# Patient Record
Sex: FEMALE | Race: OTHER | Employment: FULL TIME | URBAN - METROPOLITAN AREA
[De-identification: names, ages, dates, MRNs, and addresses within clinical notes are randomized per-mention and may not be internally consistent; named-entity substitution may affect disease eponyms.]

---

## 2017-01-11 ENCOUNTER — OFFICE VISIT (OUTPATIENT)
Dept: FAMILY MEDICINE CLINIC | Facility: CLINIC | Age: 32
End: 2017-01-11

## 2017-01-11 VITALS
TEMPERATURE: 98 F | SYSTOLIC BLOOD PRESSURE: 110 MMHG | WEIGHT: 185 LBS | HEIGHT: 64 IN | BODY MASS INDEX: 31.58 KG/M2 | DIASTOLIC BLOOD PRESSURE: 78 MMHG | RESPIRATION RATE: 16 BRPM | HEART RATE: 72 BPM

## 2017-01-11 DIAGNOSIS — J06.9 ACUTE URI: ICD-10-CM

## 2017-01-11 DIAGNOSIS — H66.91 ACUTE EAR INFECTION, RIGHT: ICD-10-CM

## 2017-01-11 PROCEDURE — 99213 OFFICE O/P EST LOW 20 MIN: CPT | Performed by: FAMILY MEDICINE

## 2017-01-11 PROCEDURE — 99212 OFFICE O/P EST SF 10 MIN: CPT | Performed by: FAMILY MEDICINE

## 2017-01-11 RX ORDER — AMOXICILLIN 875 MG/1
875 TABLET, COATED ORAL 2 TIMES DAILY
Qty: 20 TABLET | Refills: 0 | Status: SHIPPED | OUTPATIENT
Start: 2017-01-11 | End: 2017-02-06 | Stop reason: ALTCHOICE

## 2017-01-11 NOTE — PROGRESS NOTES
HPI:    Patient ID: Gertrude Allred is a 36year old female. HPI Comments: Pt presents with cold symptoms for 4 days. Pt now has had cough, sore throat and ear aches. No fevers. Pt has tried otc remedies without relief. Pt states no sick contacts.

## 2017-02-02 ENCOUNTER — TELEPHONE (OUTPATIENT)
Dept: FAMILY MEDICINE CLINIC | Facility: CLINIC | Age: 32
End: 2017-02-02

## 2017-02-02 ENCOUNTER — HOSPITAL ENCOUNTER (OUTPATIENT)
Age: 32
Discharge: HOME | End: 2017-02-02
Attending: EMERGENCY MEDICINE
Payer: COMMERCIAL

## 2017-02-02 VITALS
OXYGEN SATURATION: 96 % | RESPIRATION RATE: 18 BRPM | SYSTOLIC BLOOD PRESSURE: 126 MMHG | HEIGHT: 63 IN | TEMPERATURE: 99 F | HEART RATE: 82 BPM | BODY MASS INDEX: 31.89 KG/M2 | WEIGHT: 180 LBS | DIASTOLIC BLOOD PRESSURE: 76 MMHG

## 2017-02-02 DIAGNOSIS — J40 BRONCHITIS: Primary | ICD-10-CM

## 2017-02-02 PROCEDURE — 99204 OFFICE O/P NEW MOD 45 MIN: CPT

## 2017-02-02 PROCEDURE — 99213 OFFICE O/P EST LOW 20 MIN: CPT

## 2017-02-02 RX ORDER — ALBUTEROL SULFATE 90 UG/1
2 AEROSOL, METERED RESPIRATORY (INHALATION) EVERY 4 HOURS PRN
Qty: 1 INHALER | Refills: 0 | Status: SHIPPED | OUTPATIENT
Start: 2017-02-02 | End: 2017-03-04

## 2017-02-02 NOTE — TELEPHONE ENCOUNTER
Reason for Call/Chief Complaint: Having periods of SOB, difficulty taking a deep breath  Onset: Friday  Nursing Assessment/Associated Symptoms: Pt states that it began on Friday and happened again yesterday.  She was going up the stairs on Friday when she f

## 2017-02-02 NOTE — ED PROVIDER NOTES
Patient Seen in: [address]    History   Patient presents with:  Dyspnea TRISHA SOB (respiratory)    Stated Complaint: SOB    HPI    Patient is a 47-year-old female whose had URI symptoms for a couple weeks.   She states that otherwise stated in HPI.     Physical Exam       ED Triage Vitals   BP 02/02/17 1658 126/76 mmHg   Pulse 02/02/17 1658 82   Resp 02/02/17 1658 18   Temp 02/02/17 1658 98.7 °F (37.1 °C)   Temp src 02/02/17 1658 Oral   SpO2 02/02/17 1658 96 %   O2 Device 02/0 79863  [phone]    Schedule an appointment as soon as possible for a visit in 2 days        Medications Prescribed:  Current Discharge Medication List    START taking these medications    Albuterol Sulfate  (90 Base) MCG/ACT Inhalation Aero Soln

## 2017-02-03 ENCOUNTER — TELEPHONE (OUTPATIENT)
Dept: FAMILY MEDICINE CLINIC | Facility: CLINIC | Age: 32
End: 2017-02-03

## 2017-02-06 ENCOUNTER — OFFICE VISIT (OUTPATIENT)
Dept: FAMILY MEDICINE CLINIC | Facility: CLINIC | Age: 32
End: 2017-02-06

## 2017-02-06 VITALS
DIASTOLIC BLOOD PRESSURE: 72 MMHG | WEIGHT: 160 LBS | HEART RATE: 73 BPM | SYSTOLIC BLOOD PRESSURE: 108 MMHG | BODY MASS INDEX: 28 KG/M2 | TEMPERATURE: 98 F | RESPIRATION RATE: 20 BRPM

## 2017-02-06 DIAGNOSIS — J20.9 ACUTE BRONCHITIS, UNSPECIFIED ORGANISM: ICD-10-CM

## 2017-02-06 DIAGNOSIS — H66.91 ACUTE EAR INFECTION, RIGHT: ICD-10-CM

## 2017-02-06 PROCEDURE — 99212 OFFICE O/P EST SF 10 MIN: CPT | Performed by: FAMILY MEDICINE

## 2017-02-06 PROCEDURE — 99213 OFFICE O/P EST LOW 20 MIN: CPT | Performed by: FAMILY MEDICINE

## 2017-02-06 RX ORDER — AZITHROMYCIN 250 MG/1
TABLET, FILM COATED ORAL
Qty: 6 TABLET | Refills: 0 | Status: SHIPPED | OUTPATIENT
Start: 2017-02-06 | End: 2017-02-13

## 2017-02-06 RX ORDER — BENZONATATE 200 MG/1
200 CAPSULE ORAL 3 TIMES DAILY PRN
Qty: 30 CAPSULE | Refills: 0 | Status: SHIPPED | OUTPATIENT
Start: 2017-02-06 | End: 2017-02-13

## 2017-02-06 NOTE — PROGRESS NOTES
HPI:    Patient ID: Guillermo Camacho is a 36year old female. HPI Comments: Pt presents for follow up from the urgent care for cough/ cold symptoms. Was diagnosed with bronchitis and given an inhaler. Patient states symptoms are not much better.  No fev After discussion with patient, zpak and tessalon as directed; Over the counter remedies discussed; To call if worse or not better; Follow up in one week if not resolved or as needed if worse.       No orders of the defined types were placed in this encounte

## 2017-02-13 ENCOUNTER — OFFICE VISIT (OUTPATIENT)
Dept: OBGYN CLINIC | Facility: CLINIC | Age: 32
End: 2017-02-13

## 2017-02-13 VITALS
SYSTOLIC BLOOD PRESSURE: 118 MMHG | HEART RATE: 79 BPM | BODY MASS INDEX: 30.73 KG/M2 | DIASTOLIC BLOOD PRESSURE: 81 MMHG | HEIGHT: 64 IN | WEIGHT: 180 LBS

## 2017-02-13 DIAGNOSIS — N92.6 MISSED MENSES: Primary | ICD-10-CM

## 2017-02-13 LAB
CONTROL LINE PRESENT WITH A CLEAR BACKGROUND (YES/NO): YES YES/NO
KIT LOT #: 0 NUMERIC
PREGNANCY TEST, URINE: POSITIVE

## 2017-02-13 PROCEDURE — 99202 OFFICE O/P NEW SF 15 MIN: CPT | Performed by: ADVANCED PRACTICE MIDWIFE

## 2017-02-13 PROCEDURE — 81025 URINE PREGNANCY TEST: CPT | Performed by: ADVANCED PRACTICE MIDWIFE

## 2017-02-13 NOTE — PROGRESS NOTES
HPI:   Gertrude Allred is a 36year old female who presents for a missed menses visit. Has taken x2 rounds of antibiotics for ear infection / bronchitis in the past month. Completed all medications and not taking the cough suppressant prescribed (CAT C). No  GENERAL: well developed, well nourished,in no apparent distress  Deferred  NEURO: Oriented times three, motor and sensory are grossly intact    ASSESSMENT AND PLAcN:   Rayo Wheeler is a 36year old female who presents for a missed menses visit.

## 2017-02-15 ENCOUNTER — TELEPHONE (OUTPATIENT)
Dept: OBGYN CLINIC | Facility: CLINIC | Age: 32
End: 2017-02-15

## 2017-02-15 NOTE — TELEPHONE ENCOUNTER
Needs ob nurse appt. No appts or openings for February or schedule not open for March call pt once posted.  # [phone]. Please advise.

## 2017-02-20 ENCOUNTER — TELEPHONE (OUTPATIENT)
Dept: OBGYN CLINIC | Facility: CLINIC | Age: 32
End: 2017-02-20

## 2017-02-20 NOTE — TELEPHONE ENCOUNTER
Pt states would like to \"transfer\" from midwives to our practice as feels  \"comfortable with doctors\".   Pt states was also told by midwives they \"will not place monitor during labor to be able to listen to baby\" Pt states \"does not want that\" for h

## 2017-02-20 NOTE — TELEPHONE ENCOUNTER
Pt was advised the appts for March for OBN have not been added to the schedule yet. Pt will be notified once the schedule for OBN is available. Pt agrees with plan.

## 2017-03-01 ENCOUNTER — NURSE ONLY (OUTPATIENT)
Dept: OBGYN CLINIC | Facility: CLINIC | Age: 32
End: 2017-03-01

## 2017-03-01 ENCOUNTER — LAB ENCOUNTER (OUTPATIENT)
Dept: LAB | Facility: HOSPITAL | Age: 32
End: 2017-03-01
Attending: OBSTETRICS & GYNECOLOGY
Payer: COMMERCIAL

## 2017-03-01 VITALS — WEIGHT: 182 LBS | BODY MASS INDEX: 31.07 KG/M2 | HEIGHT: 64 IN

## 2017-03-01 DIAGNOSIS — Z34.81 ENCOUNTER FOR SUPERVISION OF OTHER NORMAL PREGNANCY IN FIRST TRIMESTER: Primary | ICD-10-CM

## 2017-03-01 DIAGNOSIS — Z34.81 ENCOUNTER FOR SUPERVISION OF OTHER NORMAL PREGNANCY IN FIRST TRIMESTER: ICD-10-CM

## 2017-03-01 LAB
ANTIBODY SCREEN: NEGATIVE
BASOPHILS # BLD: 0 K/UL (ref 0–0.2)
BASOPHILS NFR BLD: 0 %
EOSINOPHIL # BLD: 0.3 K/UL (ref 0–0.7)
EOSINOPHIL NFR BLD: 3 %
ERYTHROCYTE [DISTWIDTH] IN BLOOD BY AUTOMATED COUNT: 13.1 % (ref 11–15)
HCT VFR BLD AUTO: 41.3 % (ref 35–48)
HGB BLD-MCNC: 13.7 G/DL (ref 12–16)
LYMPHOCYTES # BLD: 2.6 K/UL (ref 1–4)
LYMPHOCYTES NFR BLD: 26 %
MCH RBC QN AUTO: 29.6 PG (ref 27–32)
MCHC RBC AUTO-ENTMCNC: 33.1 G/DL (ref 32–37)
MCV RBC AUTO: 89.4 FL (ref 80–100)
MONOCYTES # BLD: 0.4 K/UL (ref 0–1)
MONOCYTES NFR BLD: 4 %
NEUTROPHILS # BLD AUTO: 6.5 K/UL (ref 1.8–7.7)
NEUTROPHILS NFR BLD: 66 %
PLATELET # BLD AUTO: 210 K/UL (ref 140–400)
PMV BLD AUTO: 9.4 FL (ref 7.4–10.3)
RBC # BLD AUTO: 4.62 M/UL (ref 3.7–5.4)
RH BLOOD TYPE: POSITIVE
RUBV IGG SER-ACNC: 18.1 IU/ML
WBC # BLD AUTO: 9.8 K/UL (ref 4–11)

## 2017-03-01 PROCEDURE — 86787 VARICELLA-ZOSTER ANTIBODY: CPT

## 2017-03-01 PROCEDURE — 86900 BLOOD TYPING SEROLOGIC ABO: CPT

## 2017-03-01 PROCEDURE — 86762 RUBELLA ANTIBODY: CPT

## 2017-03-01 PROCEDURE — 36415 COLL VENOUS BLD VENIPUNCTURE: CPT

## 2017-03-01 PROCEDURE — 87389 HIV-1 AG W/HIV-1&-2 AB AG IA: CPT

## 2017-03-01 PROCEDURE — 85025 COMPLETE CBC W/AUTO DIFF WBC: CPT

## 2017-03-01 PROCEDURE — 86850 RBC ANTIBODY SCREEN: CPT

## 2017-03-01 PROCEDURE — 86901 BLOOD TYPING SEROLOGIC RH(D): CPT

## 2017-03-01 PROCEDURE — 86803 HEPATITIS C AB TEST: CPT

## 2017-03-01 PROCEDURE — 86780 TREPONEMA PALLIDUM: CPT

## 2017-03-01 PROCEDURE — 87340 HEPATITIS B SURFACE AG IA: CPT

## 2017-03-01 NOTE — PROGRESS NOTES
Pt seen for OBN appt today with no complaints. Normal PN labs plus one hour GTT, Hep C, and Varicella ordered. Pt advised all labs must be completed and resulted prior to MD appt.   Pt scheduled NPN appt with MD.    Partner's name is Shadia Cordero gregory

## 2017-03-02 LAB
HBV SURFACE AG SERPL QL IA: NONREACTIVE
HCV AB SERPL QL IA: NONREACTIVE
HIV1+2 AB SERPL QL IA: NONREACTIVE

## 2017-03-03 LAB
T PALLIDUM AB SER QL: NEGATIVE
VZV IGG SER IA-ACNC: 414.8 (ref 165–?)

## 2017-03-04 ENCOUNTER — APPOINTMENT (OUTPATIENT)
Dept: LAB | Facility: HOSPITAL | Age: 32
End: 2017-03-04
Attending: OBSTETRICS & GYNECOLOGY
Payer: COMMERCIAL

## 2017-03-04 DIAGNOSIS — Z34.81 ENCOUNTER FOR SUPERVISION OF OTHER NORMAL PREGNANCY IN FIRST TRIMESTER: ICD-10-CM

## 2017-03-04 LAB — GLUCOSE 1H P 50 G GLC PO SERPL-MCNC: 156 MG/DL

## 2017-03-04 PROCEDURE — 36415 COLL VENOUS BLD VENIPUNCTURE: CPT

## 2017-03-04 PROCEDURE — 82950 GLUCOSE TEST: CPT

## 2017-03-04 PROCEDURE — 87086 URINE CULTURE/COLONY COUNT: CPT

## 2017-03-06 ENCOUNTER — OFFICE VISIT (OUTPATIENT)
Dept: OBGYN CLINIC | Facility: CLINIC | Age: 32
End: 2017-03-06

## 2017-03-06 ENCOUNTER — TELEPHONE (OUTPATIENT)
Dept: OBGYN CLINIC | Facility: CLINIC | Age: 32
End: 2017-03-06

## 2017-03-06 VITALS
HEART RATE: 83 BPM | SYSTOLIC BLOOD PRESSURE: 119 MMHG | DIASTOLIC BLOOD PRESSURE: 81 MMHG | WEIGHT: 182 LBS | BODY MASS INDEX: 31 KG/M2

## 2017-03-06 DIAGNOSIS — O20.0 THREATENED ABORTION, ANTEPARTUM: Primary | ICD-10-CM

## 2017-03-06 PROCEDURE — 99212 OFFICE O/P EST SF 10 MIN: CPT | Performed by: OBSTETRICS & GYNECOLOGY

## 2017-03-06 PROCEDURE — 76815 OB US LIMITED FETUS(S): CPT | Performed by: OBSTETRICS & GYNECOLOGY

## 2017-03-06 RX ORDER — PNV,CALCIUM 72/IRON/FOLIC ACID 27 MG-1 MG
TABLET ORAL
Refills: 6 | COMMUNITY
Start: 2017-02-18 | End: 2019-05-02

## 2017-03-06 NOTE — TELEPHONE ENCOUNTER
8W0D  Pt states that she had red spotting and cramping that started today. Pt denies any recent BM or IC today. Pt did have IC yesterday. Pt states that it is vaginal bleeding. Pt made an appt to see Geni Dasilva [phone] today at [facility] [address].

## 2017-03-07 ENCOUNTER — TELEPHONE (OUTPATIENT)
Dept: OBGYN CLINIC | Facility: CLINIC | Age: 32
End: 2017-03-07

## 2017-03-07 DIAGNOSIS — R73.09 ELEVATED GLUCOSE TOLERANCE TEST: Primary | ICD-10-CM

## 2017-03-07 NOTE — TELEPHONE ENCOUNTER
----- Message from [employer], DO sent at 3/5/2017 10:02 PM CST -----  Failed 1h GTT. Needs 3h GTT. Please notify and coordinate 3h GTT. Thanks!  -DEJA-

## 2017-03-07 NOTE — TELEPHONE ENCOUNTER
Pt advised of below results per DEJA. Phone # to schedule given, fasting instructions given. Pt states understanding. 3 hr GTT ordered.

## 2017-03-13 ENCOUNTER — TELEPHONE (OUTPATIENT)
Dept: OBGYN CLINIC | Facility: CLINIC | Age: 32
End: 2017-03-13

## 2017-03-13 ENCOUNTER — INITIAL PRENATAL (OUTPATIENT)
Dept: OBGYN CLINIC | Facility: CLINIC | Age: 32
End: 2017-03-13

## 2017-03-13 VITALS
WEIGHT: 181 LBS | BODY MASS INDEX: 31 KG/M2 | HEART RATE: 67 BPM | DIASTOLIC BLOOD PRESSURE: 74 MMHG | SYSTOLIC BLOOD PRESSURE: 118 MMHG

## 2017-03-13 DIAGNOSIS — Z34.81 ENCOUNTER FOR SUPERVISION OF OTHER NORMAL PREGNANCY IN FIRST TRIMESTER: Primary | ICD-10-CM

## 2017-03-13 LAB
APPEARANCE: CLEAR
MULTISTIX LOT#: NORMAL NUMERIC
PH, URINE: 5 (ref 4.5–8)
SPECIFIC GRAVITY: 1.01 (ref 1–1.03)
URINE-COLOR: YELLOW

## 2017-03-13 PROCEDURE — 76815 OB US LIMITED FETUS(S): CPT | Performed by: OBSTETRICS & GYNECOLOGY

## 2017-03-14 NOTE — PROGRESS NOTES
Gc/chlamydia done, lps 8/2016 at [place]- pt will bring record, wants FTS, abnormal GTT- needs 3 hr GTT

## 2017-03-14 NOTE — TELEPHONE ENCOUNTER
Lmtcfrank. Please inform  The patient her order for first trimester screening was routed to [facility] and she can call [phone] to schedule an appointment.

## 2017-03-15 LAB
C TRACH DNA SPEC QL NAA+PROBE: NEGATIVE
N GONORRHOEA DNA SPEC QL NAA+PROBE: NEGATIVE
T VAGINALIS RRNA SPEC QL NAA+PROBE: NEGATIVE

## 2017-04-06 ENCOUNTER — TELEPHONE (OUTPATIENT)
Dept: OBGYN CLINIC | Facility: CLINIC | Age: 32
End: 2017-04-06

## 2017-04-07 ENCOUNTER — HOSPITAL ENCOUNTER (OUTPATIENT)
Dept: PERINATAL CARE | Facility: HOSPITAL | Age: 32
Discharge: HOME | End: 2017-04-07
Attending: OBSTETRICS & GYNECOLOGY
Payer: COMMERCIAL

## 2017-04-07 VITALS — SYSTOLIC BLOOD PRESSURE: 120 MMHG | DIASTOLIC BLOOD PRESSURE: 81 MMHG | HEART RATE: 67 BPM

## 2017-04-07 DIAGNOSIS — Z36.9 FIRST TRIMESTER SCREENING: ICD-10-CM

## 2017-04-07 DIAGNOSIS — Z36.9 FIRST TRIMESTER SCREENING: Primary | ICD-10-CM

## 2017-04-07 PROCEDURE — 76801 OB US < 14 WKS SINGLE FETUS: CPT | Performed by: OBSTETRICS & GYNECOLOGY

## 2017-04-07 PROCEDURE — 76813 OB US NUCHAL MEAS 1 GEST: CPT

## 2017-04-07 PROCEDURE — 99242 OFF/OP CONSLTJ NEW/EST SF 20: CPT | Performed by: OBSTETRICS & GYNECOLOGY

## 2017-04-07 PROCEDURE — 76813 OB US NUCHAL MEAS 1 GEST: CPT | Performed by: OBSTETRICS & GYNECOLOGY

## 2017-04-07 NOTE — PROGRESS NOTES
Viky Kruse is a 36year old female. Reason for Consult:   First Trimester Screen. Doing well. No complaints. She failed her one hour GTT. Has 3 hour GTT scheduled.     Review of History:     OB History:    Obstetric History     T1  P0 Stomach and bladder seen. Uterus and adnexa appeared normal        The complete screening results based on maternal age, NT, MALENA-A, free B-HCG will be faxed to your office directly from the lab.    The final report will give the specific risk for Down sy

## 2017-04-11 ENCOUNTER — TELEPHONE (OUTPATIENT)
Dept: OBGYN CLINIC | Facility: CLINIC | Age: 32
End: 2017-04-11

## 2017-04-11 ENCOUNTER — ROUTINE PRENATAL (OUTPATIENT)
Dept: OBGYN CLINIC | Facility: CLINIC | Age: 32
End: 2017-04-11

## 2017-04-11 VITALS
WEIGHT: 178 LBS | SYSTOLIC BLOOD PRESSURE: 119 MMHG | HEART RATE: 87 BPM | BODY MASS INDEX: 31 KG/M2 | DIASTOLIC BLOOD PRESSURE: 81 MMHG

## 2017-04-11 DIAGNOSIS — Z34.81 ENCOUNTER FOR SUPERVISION OF OTHER NORMAL PREGNANCY IN FIRST TRIMESTER: Primary | ICD-10-CM

## 2017-04-11 NOTE — PROGRESS NOTES
C/O sharp pain by her hip bones when standing- round ligament pain. No bleeding or cramping. Denies N/V. Reviewed increase hydration.    RTC 4 wks

## 2017-04-11 NOTE — TELEPHONE ENCOUNTER
Pt calling for 1st trimester US results. Pt notified a message will be forwarded to [facility](on call) to please review and advise on US results.

## 2017-04-12 ENCOUNTER — TELEPHONE (OUTPATIENT)
Dept: OBGYN CLINIC | Facility: CLINIC | Age: 32
End: 2017-04-12

## 2017-04-12 NOTE — TELEPHONE ENCOUNTER
Pt has not done the 3 hr gtt that was ordered on 3/13/17. Pt states she has an appt scheduled for this Saturday to have test done. Lab postponed 2 weeks.

## 2017-04-13 ENCOUNTER — TELEPHONE (OUTPATIENT)
Dept: PERINATAL CARE | Facility: HOSPITAL | Age: 32
End: 2017-04-13

## 2017-04-13 NOTE — TELEPHONE ENCOUNTER
First trimester screen results reviewed by Dr. Kirit Joseph for Trisomy 13,18 and 21 are all 1 in > 10,000  [address] notified by phone of results. She verbalized understanding.    The record sent to be scanned in to Epic  A copy of the report will be fax

## 2017-04-15 ENCOUNTER — LAB ENCOUNTER (OUTPATIENT)
Dept: LAB | Age: 32
End: 2017-04-15
Attending: OBSTETRICS & GYNECOLOGY
Payer: COMMERCIAL

## 2017-04-15 DIAGNOSIS — Z34.81 ENCOUNTER FOR SUPERVISION OF OTHER NORMAL PREGNANCY IN FIRST TRIMESTER: ICD-10-CM

## 2017-04-15 PROCEDURE — 36415 COLL VENOUS BLD VENIPUNCTURE: CPT

## 2017-04-15 PROCEDURE — 82951 GLUCOSE TOLERANCE TEST (GTT): CPT

## 2017-04-15 PROCEDURE — 82952 GTT-ADDED SAMPLES: CPT

## 2017-04-19 ENCOUNTER — TELEPHONE (OUTPATIENT)
Dept: OBGYN CLINIC | Facility: CLINIC | Age: 32
End: 2017-04-19

## 2017-05-10 ENCOUNTER — APPOINTMENT (OUTPATIENT)
Dept: LAB | Facility: HOSPITAL | Age: 32
End: 2017-05-10
Attending: OBSTETRICS & GYNECOLOGY
Payer: COMMERCIAL

## 2017-05-10 ENCOUNTER — ROUTINE PRENATAL (OUTPATIENT)
Dept: OBGYN CLINIC | Facility: CLINIC | Age: 32
End: 2017-05-10

## 2017-05-10 VITALS
WEIGHT: 177 LBS | BODY MASS INDEX: 30 KG/M2 | DIASTOLIC BLOOD PRESSURE: 71 MMHG | HEART RATE: 86 BPM | SYSTOLIC BLOOD PRESSURE: 105 MMHG

## 2017-05-10 DIAGNOSIS — Z34.82 ENCOUNTER FOR SUPERVISION OF OTHER NORMAL PREGNANCY IN SECOND TRIMESTER: Primary | ICD-10-CM

## 2017-05-10 DIAGNOSIS — Z34.82 ENCOUNTER FOR SUPERVISION OF OTHER NORMAL PREGNANCY IN SECOND TRIMESTER: ICD-10-CM

## 2017-05-10 PROCEDURE — 82105 ALPHA-FETOPROTEIN SERUM: CPT

## 2017-05-10 PROCEDURE — 36415 COLL VENOUS BLD VENIPUNCTURE: CPT

## 2017-05-18 ENCOUNTER — HOSPITAL ENCOUNTER (OUTPATIENT)
Dept: ULTRASOUND IMAGING | Facility: HOSPITAL | Age: 32
Discharge: HOME | End: 2017-05-18
Attending: OBSTETRICS & GYNECOLOGY
Payer: COMMERCIAL

## 2017-05-18 DIAGNOSIS — Z34.82 ENCOUNTER FOR SUPERVISION OF OTHER NORMAL PREGNANCY IN SECOND TRIMESTER: ICD-10-CM

## 2017-05-18 PROCEDURE — 76805 OB US >/= 14 WKS SNGL FETUS: CPT | Performed by: OBSTETRICS & GYNECOLOGY

## 2017-05-22 ENCOUNTER — TELEPHONE (OUTPATIENT)
Dept: OBGYN CLINIC | Facility: CLINIC | Age: 32
End: 2017-05-22

## 2017-05-22 ENCOUNTER — ROUTINE PRENATAL (OUTPATIENT)
Dept: OBGYN CLINIC | Facility: CLINIC | Age: 32
End: 2017-05-22

## 2017-05-22 VITALS
DIASTOLIC BLOOD PRESSURE: 80 MMHG | HEART RATE: 88 BPM | SYSTOLIC BLOOD PRESSURE: 116 MMHG | BODY MASS INDEX: 30 KG/M2 | WEIGHT: 176 LBS

## 2017-05-22 DIAGNOSIS — Z34.82 ENCOUNTER FOR SUPERVISION OF OTHER NORMAL PREGNANCY IN SECOND TRIMESTER: Primary | ICD-10-CM

## 2017-05-22 NOTE — TELEPHONE ENCOUNTER
Pt is 19w0d and reports left side pain 5/10, pt states pain is above the hip line and below the rib cage. Pt states it is a dull pain that will last for a few minutes and then returns since yesterday afternoon. Pt denies constipation, last BM was today.  Pt

## 2017-06-06 ENCOUNTER — ROUTINE PRENATAL (OUTPATIENT)
Dept: OBGYN CLINIC | Facility: CLINIC | Age: 32
End: 2017-06-06

## 2017-06-06 VITALS
HEART RATE: 85 BPM | WEIGHT: 177.63 LBS | SYSTOLIC BLOOD PRESSURE: 109 MMHG | BODY MASS INDEX: 30 KG/M2 | DIASTOLIC BLOOD PRESSURE: 71 MMHG

## 2017-06-06 DIAGNOSIS — Z34.92 ENCOUNTER FOR SUPERVISION OF NORMAL PREGNANCY IN SECOND TRIMESTER, UNSPECIFIED GRAVIDITY: Primary | ICD-10-CM

## 2017-07-05 ENCOUNTER — TELEPHONE (OUTPATIENT)
Dept: OBGYN CLINIC | Facility: CLINIC | Age: 32
End: 2017-07-05

## 2017-07-05 ENCOUNTER — ROUTINE PRENATAL (OUTPATIENT)
Dept: OBGYN CLINIC | Facility: CLINIC | Age: 32
End: 2017-07-05

## 2017-07-05 VITALS
SYSTOLIC BLOOD PRESSURE: 122 MMHG | WEIGHT: 178 LBS | DIASTOLIC BLOOD PRESSURE: 75 MMHG | BODY MASS INDEX: 31 KG/M2 | HEART RATE: 97 BPM

## 2017-07-05 DIAGNOSIS — Z34.82 ENCOUNTER FOR SUPERVISION OF OTHER NORMAL PREGNANCY IN SECOND TRIMESTER: Primary | ICD-10-CM

## 2017-07-05 LAB
APPEARANCE: CLEAR
MULTISTIX LOT#: NORMAL NUMERIC
URINE-COLOR: YELLOW

## 2017-07-05 NOTE — TELEPHONE ENCOUNTER
Breast pump order received via fax from [facility].  Form completed and faxed back to [facility].

## 2017-07-22 ENCOUNTER — APPOINTMENT (OUTPATIENT)
Dept: LAB | Facility: HOSPITAL | Age: 32
End: 2017-07-22
Attending: OBSTETRICS & GYNECOLOGY
Payer: COMMERCIAL

## 2017-07-22 DIAGNOSIS — Z34.82 ENCOUNTER FOR SUPERVISION OF OTHER NORMAL PREGNANCY IN SECOND TRIMESTER: ICD-10-CM

## 2017-07-22 LAB
ERYTHROCYTE [DISTWIDTH] IN BLOOD BY AUTOMATED COUNT: 14 % (ref 11–15)
GLUCOSE 1H P 50 G GLC PO SERPL-MCNC: 142 MG/DL
HCT VFR BLD AUTO: 33.3 % (ref 35–48)
HGB BLD-MCNC: 11.3 G/DL (ref 12–16)
MCH RBC QN AUTO: 29 PG (ref 27–32)
MCHC RBC AUTO-ENTMCNC: 33.7 G/DL (ref 32–37)
MCV RBC AUTO: 85.9 FL (ref 80–100)
PLATELET # BLD AUTO: 207 K/UL (ref 140–400)
PMV BLD AUTO: 8.3 FL (ref 7.4–10.3)
RBC # BLD AUTO: 3.88 M/UL (ref 3.7–5.4)
WBC # BLD AUTO: 8 K/UL (ref 4–11)

## 2017-07-22 PROCEDURE — 36415 COLL VENOUS BLD VENIPUNCTURE: CPT

## 2017-07-22 PROCEDURE — 85027 COMPLETE CBC AUTOMATED: CPT

## 2017-07-22 PROCEDURE — 82950 GLUCOSE TEST: CPT

## 2017-07-25 ENCOUNTER — TELEPHONE (OUTPATIENT)
Dept: OBGYN CLINIC | Facility: CLINIC | Age: 32
End: 2017-07-25

## 2017-07-25 DIAGNOSIS — O99.810 ABNORMAL MATERNAL GLUCOSE TOLERANCE, ANTEPARTUM: Primary | ICD-10-CM

## 2017-07-25 NOTE — TELEPHONE ENCOUNTER
LMTCB. Pt needs to be informed that she did not pass one hour gtt and she will need to take 3 hr gtt.

## 2017-07-25 NOTE — TELEPHONE ENCOUNTER
----- Message from Peter See MD sent at 7/23/2017  2:01 PM CDT -----  Pt did not pass one hour GTT and will need to take 3 hour GTT

## 2017-07-26 ENCOUNTER — ROUTINE PRENATAL (OUTPATIENT)
Dept: OBGYN CLINIC | Facility: CLINIC | Age: 32
End: 2017-07-26

## 2017-07-26 VITALS
WEIGHT: 181 LBS | HEART RATE: 96 BPM | SYSTOLIC BLOOD PRESSURE: 105 MMHG | BODY MASS INDEX: 31 KG/M2 | DIASTOLIC BLOOD PRESSURE: 71 MMHG

## 2017-07-26 DIAGNOSIS — Z34.93 ENCOUNTER FOR SUPERVISION OF NORMAL PREGNANCY IN THIRD TRIMESTER, UNSPECIFIED GRAVIDITY: Primary | ICD-10-CM

## 2017-07-26 LAB
KETONES (URINE DIPSTICK): 80 MG/DL
MULTISTIX LOT#: NORMAL NUMERIC
PH, URINE: 5 (ref 4.5–8)
SPECIFIC GRAVITY: 1.01 (ref 1–1.03)

## 2017-07-26 NOTE — TELEPHONE ENCOUNTER
Pt informed that one hr gtt was elevated and she will need to go for 3 hr gtt. Pt stated she has the number for central scheduling and will call to set up appt today. Pt informed she will need to fast for 12 hours prior to appt.  Pt verbalized understanding

## 2017-08-05 ENCOUNTER — LAB ENCOUNTER (OUTPATIENT)
Dept: LAB | Facility: HOSPITAL | Age: 32
End: 2017-08-05
Attending: OBSTETRICS & GYNECOLOGY
Payer: COMMERCIAL

## 2017-08-05 DIAGNOSIS — O99.810 ABNORMAL MATERNAL GLUCOSE TOLERANCE, ANTEPARTUM: ICD-10-CM

## 2017-08-05 LAB
GLUCOSE 1H P GLC SERPL-MCNC: 136 MG/DL
GLUCOSE 2H P GLC SERPL-MCNC: 122 MG/DL
GLUCOSE 3H P GLC SERPL-MCNC: 131 MG/DL
GLUCOSE P FAST SERPL-MCNC: 83 MG/DL (ref 70–99)

## 2017-08-05 PROCEDURE — 82951 GLUCOSE TOLERANCE TEST (GTT): CPT

## 2017-08-05 PROCEDURE — 36415 COLL VENOUS BLD VENIPUNCTURE: CPT

## 2017-08-05 PROCEDURE — 82952 GTT-ADDED SAMPLES: CPT

## 2017-08-09 ENCOUNTER — ROUTINE PRENATAL (OUTPATIENT)
Dept: OBGYN CLINIC | Facility: CLINIC | Age: 32
End: 2017-08-09

## 2017-08-09 VITALS
HEART RATE: 102 BPM | SYSTOLIC BLOOD PRESSURE: 108 MMHG | BODY MASS INDEX: 31 KG/M2 | WEIGHT: 182 LBS | DIASTOLIC BLOOD PRESSURE: 71 MMHG

## 2017-08-09 DIAGNOSIS — Z34.83 ENCOUNTER FOR SUPERVISION OF OTHER NORMAL PREGNANCY IN THIRD TRIMESTER: Primary | ICD-10-CM

## 2017-08-09 LAB
APPEARANCE: CLEAR
MULTISTIX LOT#: NORMAL NUMERIC
URINE-COLOR: YELLOW

## 2017-08-10 ENCOUNTER — TELEPHONE (OUTPATIENT)
Dept: OBGYN CLINIC | Facility: CLINIC | Age: 32
End: 2017-08-10

## 2017-08-10 DIAGNOSIS — O99.213 OBESITY AFFECTING PREGNANCY, ANTEPARTUM, THIRD TRIMESTER: Primary | ICD-10-CM

## 2017-08-10 PROBLEM — O99.210 OBESITY AFFECTING PREGNANCY, ANTEPARTUM (HCC): Status: ACTIVE | Noted: 2017-08-10

## 2017-08-10 PROBLEM — O99.210 OBESITY AFFECTING PREGNANCY, ANTEPARTUM: Status: ACTIVE | Noted: 2017-08-10

## 2017-08-10 NOTE — TELEPHONE ENCOUNTER
She was to have an EFW at 32 weeks due to pre-pregnancy BMI > 30. Order sent. OK to schedule. Thanks.

## 2017-08-10 NOTE — TELEPHONE ENCOUNTER
PT REQUESTING AN ORDER TO BE PUT INTO THE SYSTEM / DR TAYLOR WAS SUPPOSE TO SEND OVER THE ORDER / PLS ADV

## 2017-08-10 NOTE — TELEPHONE ENCOUNTER
Pt is 30w3d and asking for US order that was discussed with CLAUDIA during PN visit yesterday. Asked pt if she knows the reason for the [address]. Pt states she does not but she did ask if she can have another US before delivery.  Pt informed a message will be forward

## 2017-08-11 ENCOUNTER — HOSPITAL ENCOUNTER (OUTPATIENT)
Dept: ULTRASOUND IMAGING | Facility: HOSPITAL | Age: 32
Discharge: HOME | End: 2017-08-11
Attending: OBSTETRICS & GYNECOLOGY
Payer: COMMERCIAL

## 2017-08-11 DIAGNOSIS — O99.213 OBESITY AFFECTING PREGNANCY, ANTEPARTUM, THIRD TRIMESTER: ICD-10-CM

## 2017-08-11 PROCEDURE — 76816 OB US FOLLOW-UP PER FETUS: CPT | Performed by: OBSTETRICS & GYNECOLOGY

## 2017-08-22 ENCOUNTER — ROUTINE PRENATAL (OUTPATIENT)
Dept: OBGYN CLINIC | Facility: CLINIC | Age: 32
End: 2017-08-22

## 2017-08-22 VITALS
HEART RATE: 101 BPM | BODY MASS INDEX: 32 KG/M2 | SYSTOLIC BLOOD PRESSURE: 111 MMHG | DIASTOLIC BLOOD PRESSURE: 74 MMHG | WEIGHT: 183.81 LBS

## 2017-08-22 DIAGNOSIS — Z34.93 ENCOUNTER FOR SUPERVISION OF NORMAL PREGNANCY IN THIRD TRIMESTER, UNSPECIFIED GRAVIDITY: Primary | ICD-10-CM

## 2017-08-22 LAB
MULTISTIX LOT#: NORMAL NUMERIC
PH, URINE: 6 (ref 4.5–8)
SPECIFIC GRAVITY: 1.02 (ref 1–1.03)
UROBILINOGEN,SEMI-QN: 0.2 MG/DL (ref 0–1.9)

## 2017-09-07 ENCOUNTER — ROUTINE PRENATAL (OUTPATIENT)
Dept: OBGYN CLINIC | Facility: CLINIC | Age: 32
End: 2017-09-07

## 2017-09-07 VITALS
HEART RATE: 80 BPM | SYSTOLIC BLOOD PRESSURE: 101 MMHG | DIASTOLIC BLOOD PRESSURE: 68 MMHG | BODY MASS INDEX: 32 KG/M2 | WEIGHT: 185.38 LBS

## 2017-09-07 DIAGNOSIS — Z34.93 ENCOUNTER FOR SUPERVISION OF NORMAL PREGNANCY IN THIRD TRIMESTER, UNSPECIFIED GRAVIDITY: Primary | ICD-10-CM

## 2017-09-07 LAB
APPEARANCE: CLEAR
MULTISTIX LOT#: NORMAL NUMERIC
PH, URINE: 6.5 (ref 4.5–8)
SPECIFIC GRAVITY: 1.01 (ref 1–1.03)
URINE-COLOR: YELLOW
UROBILINOGEN,SEMI-QN: 0.2 MG/DL (ref 0–1.9)

## 2017-09-17 ENCOUNTER — APPOINTMENT (OUTPATIENT)
Dept: LAB | Facility: HOSPITAL | Age: 32
End: 2017-09-17
Attending: OBSTETRICS & GYNECOLOGY
Payer: COMMERCIAL

## 2017-09-17 DIAGNOSIS — Z34.93 ENCOUNTER FOR SUPERVISION OF NORMAL PREGNANCY IN THIRD TRIMESTER, UNSPECIFIED GRAVIDITY: ICD-10-CM

## 2017-09-17 LAB
ERYTHROCYTE [DISTWIDTH] IN BLOOD BY AUTOMATED COUNT: 15.1 % (ref 11–15)
HCT VFR BLD AUTO: 35 % (ref 35–48)
HGB BLD-MCNC: 11.6 G/DL (ref 12–16)
MCH RBC QN AUTO: 28.3 PG (ref 27–32)
MCHC RBC AUTO-ENTMCNC: 33.1 G/DL (ref 32–37)
MCV RBC AUTO: 85.7 FL (ref 80–100)
PLATELET # BLD AUTO: 183 K/UL (ref 140–400)
PMV BLD AUTO: 9.4 FL (ref 7.4–10.3)
RBC # BLD AUTO: 4.09 M/UL (ref 3.7–5.4)
WBC # BLD AUTO: 8.5 K/UL (ref 4–11)

## 2017-09-17 PROCEDURE — 36415 COLL VENOUS BLD VENIPUNCTURE: CPT

## 2017-09-17 PROCEDURE — 86780 TREPONEMA PALLIDUM: CPT

## 2017-09-17 PROCEDURE — 85027 COMPLETE CBC AUTOMATED: CPT

## 2017-09-18 ENCOUNTER — ROUTINE PRENATAL (OUTPATIENT)
Dept: OBGYN CLINIC | Facility: CLINIC | Age: 32
End: 2017-09-18

## 2017-09-18 VITALS
SYSTOLIC BLOOD PRESSURE: 115 MMHG | BODY MASS INDEX: 32 KG/M2 | WEIGHT: 184 LBS | HEART RATE: 92 BPM | DIASTOLIC BLOOD PRESSURE: 77 MMHG

## 2017-09-18 DIAGNOSIS — Z34.93 ENCOUNTER FOR SUPERVISION OF NORMAL PREGNANCY IN THIRD TRIMESTER, UNSPECIFIED GRAVIDITY: Primary | ICD-10-CM

## 2017-09-18 LAB
MULTISTIX LOT#: NORMAL NUMERIC
PH, URINE: 6.5 (ref 4.5–8)
SPECIFIC GRAVITY: 1.01 (ref 1–1.03)
T PALLIDUM AB SER QL: NEGATIVE
UROBILINOGEN,SEMI-QN: 0.2 MG/DL (ref 0–1.9)

## 2017-09-26 ENCOUNTER — ROUTINE PRENATAL (OUTPATIENT)
Dept: OBGYN CLINIC | Facility: CLINIC | Age: 32
End: 2017-09-26

## 2017-09-26 VITALS
HEART RATE: 86 BPM | BODY MASS INDEX: 32 KG/M2 | SYSTOLIC BLOOD PRESSURE: 110 MMHG | WEIGHT: 187 LBS | DIASTOLIC BLOOD PRESSURE: 74 MMHG

## 2017-09-26 DIAGNOSIS — Z34.93 ENCOUNTER FOR SUPERVISION OF NORMAL PREGNANCY IN THIRD TRIMESTER, UNSPECIFIED GRAVIDITY: Primary | ICD-10-CM

## 2017-09-26 LAB
MULTISTIX LOT#: NORMAL NUMERIC
PH, URINE: 6.5 (ref 4.5–8)
SPECIFIC GRAVITY: 1.01 (ref 1–1.03)
UROBILINOGEN,SEMI-QN: 0.2 MG/DL (ref 0–1.9)

## 2017-10-03 ENCOUNTER — ROUTINE PRENATAL (OUTPATIENT)
Dept: OBGYN CLINIC | Facility: CLINIC | Age: 32
End: 2017-10-03

## 2017-10-03 VITALS
SYSTOLIC BLOOD PRESSURE: 111 MMHG | HEART RATE: 78 BPM | DIASTOLIC BLOOD PRESSURE: 73 MMHG | WEIGHT: 185 LBS | BODY MASS INDEX: 32 KG/M2

## 2017-10-03 DIAGNOSIS — Z34.93 ENCOUNTER FOR SUPERVISION OF NORMAL PREGNANCY IN THIRD TRIMESTER, UNSPECIFIED GRAVIDITY: Primary | ICD-10-CM

## 2017-10-11 ENCOUNTER — ROUTINE PRENATAL (OUTPATIENT)
Dept: OBGYN CLINIC | Facility: CLINIC | Age: 32
End: 2017-10-11

## 2017-10-11 VITALS
SYSTOLIC BLOOD PRESSURE: 108 MMHG | BODY MASS INDEX: 32 KG/M2 | WEIGHT: 185 LBS | HEART RATE: 80 BPM | DIASTOLIC BLOOD PRESSURE: 74 MMHG

## 2017-10-11 DIAGNOSIS — Z34.83 ENCOUNTER FOR SUPERVISION OF OTHER NORMAL PREGNANCY IN THIRD TRIMESTER: Primary | ICD-10-CM

## 2017-10-11 NOTE — PROGRESS NOTES
Pt feels cramping on and off. No contractions. She feels good fetal movements.  Reviewed kick counts  RTC 1 wk

## 2017-10-14 ENCOUNTER — HOSPITAL ENCOUNTER (INPATIENT)
Facility: HOSPITAL | Age: 32
LOS: 1 days | Discharge: HOME | End: 2017-10-15
Attending: OBSTETRICS & GYNECOLOGY | Admitting: OBSTETRICS & GYNECOLOGY
Payer: COMMERCIAL

## 2017-10-14 ENCOUNTER — ANESTHESIA EVENT (OUTPATIENT)
Dept: OBGYN UNIT | Facility: HOSPITAL | Age: 32
End: 2017-10-14
Payer: COMMERCIAL

## 2017-10-14 ENCOUNTER — ANESTHESIA (OUTPATIENT)
Dept: OBGYN UNIT | Facility: HOSPITAL | Age: 32
End: 2017-10-14
Payer: COMMERCIAL

## 2017-10-14 PROBLEM — O42.90 AMNIOTIC FLUID LEAKING: Status: ACTIVE | Noted: 2017-10-14

## 2017-10-14 PROBLEM — O42.90 AMNIOTIC FLUID LEAKING (HCC): Status: ACTIVE | Noted: 2017-10-14

## 2017-10-14 PROCEDURE — 59400 OBSTETRICAL CARE: CPT | Performed by: OBSTETRICS & GYNECOLOGY

## 2017-10-14 RX ORDER — ONDANSETRON 2 MG/ML
4 INJECTION INTRAMUSCULAR; INTRAVENOUS EVERY 6 HOURS PRN
Status: DISCONTINUED | OUTPATIENT
Start: 2017-10-14 | End: 2017-10-15

## 2017-10-14 RX ORDER — TRISODIUM CITRATE DIHYDRATE AND CITRIC ACID MONOHYDRATE 500; 334 MG/5ML; MG/5ML
30 SOLUTION ORAL AS NEEDED
Status: DISCONTINUED | OUTPATIENT
Start: 2017-10-14 | End: 2017-10-14 | Stop reason: HOSPADM

## 2017-10-14 RX ORDER — BUPIVACAINE HYDROCHLORIDE 2.5 MG/ML
INJECTION, SOLUTION EPIDURAL; INFILTRATION; INTRACAUDAL
Status: DISPENSED
Start: 2017-10-14 | End: 2017-10-14

## 2017-10-14 RX ORDER — DEXTROSE, SODIUM CHLORIDE, SODIUM LACTATE, POTASSIUM CHLORIDE, AND CALCIUM CHLORIDE 5; .6; .31; .03; .02 G/100ML; G/100ML; G/100ML; G/100ML; G/100ML
125 INJECTION, SOLUTION INTRAVENOUS CONTINUOUS
Status: DISCONTINUED | OUTPATIENT
Start: 2017-10-14 | End: 2017-10-14 | Stop reason: HOSPADM

## 2017-10-14 RX ORDER — AMMONIA INHALANTS 0.04 G/.3ML
0.3 INHALANT RESPIRATORY (INHALATION) AS NEEDED
Status: DISCONTINUED | OUTPATIENT
Start: 2017-10-14 | End: 2017-10-15

## 2017-10-14 RX ORDER — SODIUM CHLORIDE, SODIUM LACTATE, POTASSIUM CHLORIDE, CALCIUM CHLORIDE 600; 310; 30; 20 MG/100ML; MG/100ML; MG/100ML; MG/100ML
INJECTION, SOLUTION INTRAVENOUS
Status: COMPLETED
Start: 2017-10-14 | End: 2017-10-14

## 2017-10-14 RX ORDER — SODIUM CHLORIDE 0.9 % (FLUSH) 0.9 %
10 SYRINGE (ML) INJECTION AS NEEDED
Status: DISCONTINUED | OUTPATIENT
Start: 2017-10-14 | End: 2017-10-14 | Stop reason: HOSPADM

## 2017-10-14 RX ORDER — PRENATAL VIT,CAL 76/IRON/FOLIC 29 MG-1 MG
1 TABLET ORAL DAILY
Status: DISCONTINUED | OUTPATIENT
Start: 2017-10-14 | End: 2017-10-15

## 2017-10-14 RX ORDER — IBUPROFEN 200 MG
200 TABLET ORAL EVERY 4 HOURS PRN
Status: DISCONTINUED | OUTPATIENT
Start: 2017-10-14 | End: 2017-10-15

## 2017-10-14 RX ORDER — IBUPROFEN 200 MG
400 TABLET ORAL EVERY 4 HOURS PRN
Status: DISCONTINUED | OUTPATIENT
Start: 2017-10-14 | End: 2017-10-15

## 2017-10-14 RX ORDER — SODIUM CHLORIDE, SODIUM LACTATE, POTASSIUM CHLORIDE, CALCIUM CHLORIDE 600; 310; 30; 20 MG/100ML; MG/100ML; MG/100ML; MG/100ML
INJECTION, SOLUTION INTRAVENOUS CONTINUOUS
Status: DISCONTINUED | OUTPATIENT
Start: 2017-10-14 | End: 2017-10-14 | Stop reason: HOSPADM

## 2017-10-14 RX ORDER — TERBUTALINE SULFATE 1 MG/ML
0.25 INJECTION, SOLUTION SUBCUTANEOUS AS NEEDED
Status: DISCONTINUED | OUTPATIENT
Start: 2017-10-14 | End: 2017-10-14 | Stop reason: HOSPADM

## 2017-10-14 RX ORDER — DIAPER,BRIEF,INFANT-TODD,DISP
1 EACH MISCELLANEOUS EVERY 6 HOURS PRN
Status: DISCONTINUED | OUTPATIENT
Start: 2017-10-14 | End: 2017-10-15

## 2017-10-14 RX ORDER — BISACODYL 10 MG
10 SUPPOSITORY, RECTAL RECTAL ONCE AS NEEDED
Status: DISCONTINUED | OUTPATIENT
Start: 2017-10-14 | End: 2017-10-15

## 2017-10-14 RX ORDER — IBUPROFEN 600 MG/1
600 TABLET ORAL ONCE AS NEEDED
Status: DISCONTINUED | OUTPATIENT
Start: 2017-10-14 | End: 2017-10-14 | Stop reason: HOSPADM

## 2017-10-14 RX ORDER — BUPIVACAINE HYDROCHLORIDE 2.5 MG/ML
INJECTION, SOLUTION EPIDURAL; INFILTRATION; INTRACAUDAL AS NEEDED
Status: DISCONTINUED | OUTPATIENT
Start: 2017-10-14 | End: 2017-10-14 | Stop reason: SURG

## 2017-10-14 RX ORDER — AMMONIA INHALANTS 0.04 G/.3ML
0.3 INHALANT RESPIRATORY (INHALATION) AS NEEDED
Status: DISCONTINUED | OUTPATIENT
Start: 2017-10-14 | End: 2017-10-14 | Stop reason: HOSPADM

## 2017-10-14 RX ORDER — IBUPROFEN 600 MG/1
600 TABLET ORAL EVERY 4 HOURS PRN
Status: DISCONTINUED | OUTPATIENT
Start: 2017-10-14 | End: 2017-10-15

## 2017-10-14 RX ORDER — ONDANSETRON 2 MG/ML
4 INJECTION INTRAMUSCULAR; INTRAVENOUS EVERY 6 HOURS PRN
Status: DISCONTINUED | OUTPATIENT
Start: 2017-10-14 | End: 2017-10-14 | Stop reason: HOSPADM

## 2017-10-14 RX ORDER — SIMETHICONE 80 MG
80 TABLET,CHEWABLE ORAL 3 TIMES DAILY PRN
Status: DISCONTINUED | OUTPATIENT
Start: 2017-10-14 | End: 2017-10-15

## 2017-10-14 RX ORDER — EPHEDRINE SULFATE/0.9% NACL/PF 25 MG/5 ML
SYRINGE (ML) INTRAVENOUS
Status: DISCONTINUED
Start: 2017-10-14 | End: 2017-10-14 | Stop reason: WASHOUT

## 2017-10-14 RX ORDER — SODIUM CHLORIDE 0.9 % (FLUSH) 0.9 %
10 SYRINGE (ML) INJECTION AS NEEDED
Status: DISCONTINUED | OUTPATIENT
Start: 2017-10-14 | End: 2017-10-14

## 2017-10-14 RX ORDER — EPHEDRINE SULFATE/0.9% NACL/PF 25 MG/5 ML
5 SYRINGE (ML) INTRAVENOUS AS NEEDED
Status: CANCELLED | OUTPATIENT
Start: 2017-10-14

## 2017-10-14 RX ORDER — NALBUPHINE HCL 10 MG/ML
2.5 AMPUL (ML) INJECTION
Status: CANCELLED | OUTPATIENT
Start: 2017-10-14

## 2017-10-14 RX ORDER — LIDOCAINE HYDROCHLORIDE 10 MG/ML
30 INJECTION, SOLUTION EPIDURAL; INFILTRATION; INTRACAUDAL; PERINEURAL ONCE
Status: DISCONTINUED | OUTPATIENT
Start: 2017-10-14 | End: 2017-10-14 | Stop reason: HOSPADM

## 2017-10-14 RX ORDER — LIDOCAINE HYDROCHLORIDE AND EPINEPHRINE 20; 5 MG/ML; UG/ML
INJECTION, SOLUTION EPIDURAL; INFILTRATION; INTRACAUDAL; PERINEURAL
Status: DISCONTINUED
Start: 2017-10-14 | End: 2017-10-14 | Stop reason: WASHOUT

## 2017-10-14 RX ORDER — SODIUM CHLORIDE, SODIUM LACTATE, POTASSIUM CHLORIDE, CALCIUM CHLORIDE 600; 310; 30; 20 MG/100ML; MG/100ML; MG/100ML; MG/100ML
INJECTION, SOLUTION INTRAVENOUS
Status: DISPENSED
Start: 2017-10-14 | End: 2017-10-14

## 2017-10-14 RX ORDER — DOCUSATE SODIUM 100 MG/1
100 CAPSULE, LIQUID FILLED ORAL 2 TIMES DAILY
Status: DISCONTINUED | OUTPATIENT
Start: 2017-10-14 | End: 2017-10-15

## 2017-10-14 RX ADMIN — BUPIVACAINE HYDROCHLORIDE 10 ML: 2.5 INJECTION, SOLUTION EPIDURAL; INFILTRATION; INTRACAUDAL at 04:32:00

## 2017-10-14 NOTE — L&D DELIVERY NOTE
[facility] HOSP - [facility]    Vaginal Delivery Note    Juliann Pollock Patient Status:  Inpatient    1985 MRN [id number]   Location [unfilled] Attending Shruti Ochoa MD   Hosp Day # 0 PCP Gold France MD     Delivery     Infant Info:  Date of D

## 2017-10-14 NOTE — ANESTHESIA POSTPROCEDURE EVALUATION
Patient: Javier Norris    Procedure Summary     Date:  10/14/17 Room / Location:      Anesthesia Start:  0421 Anesthesia Stop:  0700    Procedure:  LABOR ANALGESIA Diagnosis:      Scheduled Providers:   Anesthesiologist:  MD Clara Wen

## 2017-10-14 NOTE — PROGRESS NOTES
0915 assisted up to br pericare done  Unable to void at this time   Moved to pp per w/c  Report give

## 2017-10-14 NOTE — ANESTHESIA PROCEDURE NOTES
Labor Analgesia  Performed by: Cruz Gaffney  Authorized by: Cruz Gaffney     Patient Location:  OB  Start Time:  10/14/2017 4:21 AM  End Time:  10/14/2017 4:31 AM  Site Identification: surface landmarks    Reason for Block: labor epidural

## 2017-10-14 NOTE — H&P
[facility] Patient Status:  Inpatient    1985 MRN [id number]   Location [address] [place] [address] Attending Juan Manuel Mauro MD   Hosp Day # 0 PCP Adams Jung MD     Date of Adm 87  Resp:  [16] 16  BP: (107-128)/(60-91) 118/78    Constitutional: alert and cooperative in moderate distress  Abdomen: gravid nontender  Vaginal exam:  Dilation: 10 cm    Effacement: 100 %    Station: +2    Position: Cephalic        FHT assessment:   Bas

## 2017-10-14 NOTE — ANESTHESIA PREPROCEDURE EVALUATION
Anesthesia PreOp Note    HPI:     Jatin Rowan is a 29year old female who presents for preoperative consultation requested by: * No surgeons listed *    Date of Surgery:     * No procedures listed *  Indication: * No pre-op diagnosis entered *      [facility]) injection 4 mg 4 mg Intravenous Q6H PRN Greyson Beard MD     fentaNYL citrate (SUBLIMAZE) 0.05 MG/ML injection         lidocaine-EPINEPHrine 2 %-1:924118 injection         EPHEDrine sulfate in NaCl 0.9% (PF) 25-0.9 MG/5ML-% injection         Bu summary reviewed and Nursing notes reviewed    Airway   Mallampati: II  TM distance: >3 FB  Dental - normal exam     Pulmonary - negative ROS and normal exam   Cardiovascular - negative ROS and normal exam    Neuro/Psych - negative ROS     GI/Hepatic/Renal

## 2017-10-14 NOTE — PROGRESS NOTES
Mother states her nipples hurt, both are red and left bleeding. Breast shells given, nipple shield given and demo use. Lanolin given and explained use. Explained to call RN for next feeding to assess latch.

## 2017-10-15 VITALS
BODY MASS INDEX: 32.6 KG/M2 | HEIGHT: 63 IN | RESPIRATION RATE: 16 BRPM | TEMPERATURE: 98 F | HEART RATE: 75 BPM | SYSTOLIC BLOOD PRESSURE: 105 MMHG | DIASTOLIC BLOOD PRESSURE: 71 MMHG | WEIGHT: 184 LBS | OXYGEN SATURATION: 98 %

## 2017-10-15 RX ORDER — PRENATAL VIT,CAL 76/IRON/FOLIC 29 MG-1 MG
1 TABLET ORAL DAILY
Qty: 1 TABLET | Refills: 0 | Status: SHIPPED | OUTPATIENT
Start: 2017-10-16 | End: 2019-05-02

## 2017-10-15 NOTE — PROGRESS NOTES
Patient requesting formula, states that she is concerned that baby is not getting enough milk, is concerned that baby has been nursing on and off most of the night.   This RN discussed benefits of breastfeeding and importance of watching baby for hunger cue

## 2017-10-15 NOTE — PROGRESS NOTES
Post-Partum Note   10/15/2017, 10:17 AM    Subjective:  Patient doing well. Pain mild. Lochia is small. She reports she does tolerate a regular diet. She denies headache, fever, chest pain, shortness of breath, and leg pain.   She has ambulated and denie

## 2017-12-04 ENCOUNTER — TELEPHONE (OUTPATIENT)
Dept: OBGYN CLINIC | Facility: CLINIC | Age: 32
End: 2017-12-04

## 2017-12-04 ENCOUNTER — POSTPARTUM (OUTPATIENT)
Dept: OBGYN CLINIC | Facility: CLINIC | Age: 32
End: 2017-12-04

## 2017-12-04 VITALS
DIASTOLIC BLOOD PRESSURE: 73 MMHG | WEIGHT: 167 LBS | BODY MASS INDEX: 30 KG/M2 | HEART RATE: 59 BPM | SYSTOLIC BLOOD PRESSURE: 112 MMHG

## 2017-12-04 PROBLEM — O99.210 OBESITY AFFECTING PREGNANCY, ANTEPARTUM (HCC): Status: RESOLVED | Noted: 2017-08-10 | Resolved: 2017-12-04

## 2017-12-04 PROBLEM — O99.210 OBESITY AFFECTING PREGNANCY, ANTEPARTUM: Status: RESOLVED | Noted: 2017-08-10 | Resolved: 2017-12-04

## 2017-12-04 NOTE — PROGRESS NOTES
NESS    Geovanna Black is a 29year old female  here for 6 week post-partum visit. Patient delivered a  male infant on 10/14/17. Patient desires mirena for contraception- she has used this in the past without problems.   Patient is bottle feeding call on first day of menses for mirena placement. No orders of the defined types were placed in this encounter.

## 2017-12-05 NOTE — TELEPHONE ENCOUNTER
LMTCB. PLEASE RELAY INFO:    Please inform patient she can call insurer can ask if precert is required for insertion of  iud- Mirena needed codes 95110- insertion -umg device.

## 2017-12-21 ENCOUNTER — TELEPHONE (OUTPATIENT)
Dept: OBGYN CLINIC | Facility: CLINIC | Age: 32
End: 2017-12-21

## 2017-12-21 NOTE — TELEPHONE ENCOUNTER
Pt calling to report she started her period today and is calling to schedule mirena IUD insertion appt. Pt saw [facility] on 12/4 for PP appt. Pt stated she is bottle feeding. Pt accepted appt with [facility] on 12/27 for IUD insertion.  Pt advised to take 600mg of ibupr

## 2017-12-28 ENCOUNTER — OFFICE VISIT (OUTPATIENT)
Dept: OBGYN CLINIC | Facility: CLINIC | Age: 32
End: 2017-12-28

## 2017-12-28 VITALS — DIASTOLIC BLOOD PRESSURE: 83 MMHG | HEART RATE: 76 BPM | SYSTOLIC BLOOD PRESSURE: 124 MMHG

## 2017-12-28 DIAGNOSIS — Z30.430 ENCOUNTER FOR INSERTION OF INTRAUTERINE CONTRACEPTIVE DEVICE: Primary | ICD-10-CM

## 2017-12-28 PROCEDURE — 58300 INSERT INTRAUTERINE DEVICE: CPT | Performed by: OBSTETRICS & GYNECOLOGY

## 2017-12-28 NOTE — PROCEDURES
IUD Insertion     Pregnancy Results: negative from n/a test   Birth control method(s) used:  none    Consent signed. Procedure discussed with the patient in detail including indication, risks, benefits, alternatives and complications.     Pelvic Exam Findi

## 2019-01-14 ENCOUNTER — NURSE TRIAGE (OUTPATIENT)
Dept: FAMILY MEDICINE CLINIC | Facility: CLINIC | Age: 34
End: 2019-01-14

## 2019-01-14 ENCOUNTER — OFFICE VISIT (OUTPATIENT)
Dept: FAMILY MEDICINE CLINIC | Facility: CLINIC | Age: 34
End: 2019-01-14
Payer: COMMERCIAL

## 2019-01-14 VITALS
TEMPERATURE: 99 F | WEIGHT: 165 LBS | BODY MASS INDEX: 28.17 KG/M2 | OXYGEN SATURATION: 100 % | RESPIRATION RATE: 16 BRPM | HEIGHT: 64 IN | SYSTOLIC BLOOD PRESSURE: 122 MMHG | HEART RATE: 93 BPM | DIASTOLIC BLOOD PRESSURE: 80 MMHG

## 2019-01-14 DIAGNOSIS — J02.9 SORE THROAT: Primary | ICD-10-CM

## 2019-01-14 LAB
CONTROL LINE PRESENT WITH A CLEAR BACKGROUND (YES/NO): YES YES/NO
STREP GRP A CUL-SCR: NEGATIVE

## 2019-01-14 PROCEDURE — 99202 OFFICE O/P NEW SF 15 MIN: CPT | Performed by: NURSE PRACTITIONER

## 2019-01-14 PROCEDURE — 87081 CULTURE SCREEN ONLY: CPT | Performed by: NURSE PRACTITIONER

## 2019-01-14 PROCEDURE — 87880 STREP A ASSAY W/OPTIC: CPT | Performed by: NURSE PRACTITIONER

## 2019-01-14 PROCEDURE — 87147 CULTURE TYPE IMMUNOLOGIC: CPT | Performed by: NURSE PRACTITIONER

## 2019-01-14 NOTE — TELEPHONE ENCOUNTER
Action Requested: Summary for Provider     []  Critical Lab, Recommendations Needed  [] Need Additional Advice  [x]   FYI    []   Need Orders  [] Need Medications Sent to Pharmacy  []  Other     SUMMARY: Pt called states she has sore throat that started la

## 2019-01-14 NOTE — PROGRESS NOTES
CHIEF COMPLAINT:   Patient presents with:  Sore Throat: X 1 day, headache, no fever. HPI:   Hannah Ellis is a 34year old female presents to clinic with complaint of sore throat. Patient has had for 1 days.  Symptoms have been stable since on EARS: TM's clear, non-injected, no bulging, retraction, or fluid bilaterally  NOSE: nostrils patent, clear nasal discharge, nasal mucosa pink  THROAT: oral mucosa pink, moist. Posterior pharynx slightly erythematous.  Petechiae noted on upper palate no exud Follow up in 3-5 days if not improving, condition worsens, or fever greater than or equal to 100.4 persists for 72 hours.       Patient Instructions       Self-Care for Sore Throats    Sore throats happen for many reasons, such as colds, allergies, and infe · When you’re around someone with a sore throat or cold, wash your hands often to keep viruses or bacteria from spreading. · Don’t strain your vocal cords.   Call your healthcare provider  Contact your healthcare provider if you have:  · A temperature over

## 2019-01-16 ENCOUNTER — TELEPHONE (OUTPATIENT)
Dept: FAMILY MEDICINE CLINIC | Facility: CLINIC | Age: 34
End: 2019-01-16

## 2019-01-16 DIAGNOSIS — A49.1 STREPTOCOCCUS INFECTION, GROUP B: Primary | ICD-10-CM

## 2019-01-16 RX ORDER — AMOXICILLIN 500 MG/1
500 CAPSULE ORAL 2 TIMES DAILY
Qty: 20 CAPSULE | Refills: 0 | Status: SHIPPED | OUTPATIENT
Start: 2019-01-16 | End: 2019-01-26

## 2019-01-16 NOTE — TELEPHONE ENCOUNTER
Discussed test results with patient. Pt reports slight improvement in throat. Discussed treatment vs comfort. Pt more comfortable with treatment. Will Rx amoxicillin. Discussed risks, benefits, and side effects of medication.  Pt denied any allergies, curre

## 2019-05-02 ENCOUNTER — OFFICE VISIT (OUTPATIENT)
Dept: OBGYN CLINIC | Facility: CLINIC | Age: 34
End: 2019-05-02
Payer: COMMERCIAL

## 2019-05-02 VITALS
WEIGHT: 166.81 LBS | DIASTOLIC BLOOD PRESSURE: 76 MMHG | HEIGHT: 64 IN | BODY MASS INDEX: 28.48 KG/M2 | SYSTOLIC BLOOD PRESSURE: 108 MMHG | HEART RATE: 59 BPM

## 2019-05-02 DIAGNOSIS — Z01.419 WELL WOMAN EXAM: Primary | ICD-10-CM

## 2019-05-02 DIAGNOSIS — Z11.3 SCREEN FOR STD (SEXUALLY TRANSMITTED DISEASE): ICD-10-CM

## 2019-05-02 DIAGNOSIS — Z12.4 SCREENING FOR MALIGNANT NEOPLASM OF CERVIX: ICD-10-CM

## 2019-05-02 PROBLEM — O42.90 AMNIOTIC FLUID LEAKING (HCC): Status: RESOLVED | Noted: 2017-10-14 | Resolved: 2019-05-02

## 2019-05-02 PROBLEM — O42.90 AMNIOTIC FLUID LEAKING: Status: RESOLVED | Noted: 2017-10-14 | Resolved: 2019-05-02

## 2019-05-02 PROCEDURE — 99395 PREV VISIT EST AGE 18-39: CPT | Performed by: OBSTETRICS & GYNECOLOGY

## 2019-05-02 NOTE — H&P
HPI:  The patient is a 35 yo sexually active female here for Her[place] 39. Had an episode of clear non odorous non itchy DC last week. No issues currently. +IC with . Mirena for contraception. Rare menses with light flow.       LPS: unkonwn    Reviewed of club or organization: Not on file        Attends meetings of clubs or organizations: Not on file        Relationship status: Not on file      Intimate partner violence:        Fear of current or ex partner: Not on file        Emotionally abused: Not on ascultation bilaterally   Lymphatic:no abnormal supraclavicular or axillary adenopathy is noted  Breast: normal without palpable masses, tenderness, asymmetry, nipple discharge, nipple retraction or skin changes  Abdomen:  soft, nontender, nondistended, no

## 2020-05-04 ENCOUNTER — OFFICE VISIT (OUTPATIENT)
Dept: DERMATOLOGY CLINIC | Facility: CLINIC | Age: 35
End: 2020-05-04
Payer: COMMERCIAL

## 2020-05-04 DIAGNOSIS — L30.8 PSORIASIFORM DERMATITIS: Primary | ICD-10-CM

## 2020-05-04 PROCEDURE — 99202 OFFICE O/P NEW SF 15 MIN: CPT | Performed by: DERMATOLOGY

## 2020-05-10 NOTE — PROGRESS NOTES
Arlyn Malik is a 30year old female. Patient presents with:  Derm Problem: New pt presents with hyperpigmentation to bilateral elbows. Pt had tried lemon and baking soda, lightening creams, and exfoliating. No improvement.             Patient has Food insecurity:        Worry: Not on file        Inability: Not on file      Transportation needs:        Medical: Not on file        Non-medical: Not on file    Tobacco Use      Smoking status: Never Smoker      Smokeless tobacco: Never Used    [employer] Mother                       HPI :      Patient presents with:  Derm Problem: New pt presents with hyperpigmentation to bilateral elbows. Pt had tried lemon and baking soda, lightening creams, and exfoliating. No improvement.     Hyperpigmentation has been triamcinolone taper off the triamcinolone. Would not add bleaching agent at this point. Skin care discussed meds in grid. Skin care instructions reviewed.   The use various products including moisturizers, creams soaps cleansers detergent etc. reviewed w

## 2022-12-12 NOTE — PROGRESS NOTES
Viky Kruse is a 36year old female  Patient's last menstrual period was 2017 (within days). Patient presents with:  Gyn Problem: Pregnant with Spotting    Here for PN problem. Had spotting today. EGA 8 wks by LMP.     Has OBN appt nex
all other ROS negative except as per HPI

## 2024-01-30 ENCOUNTER — OFFICE VISIT (OUTPATIENT)
Dept: FAMILY MEDICINE CLINIC | Facility: CLINIC | Age: 39
End: 2024-01-30
Payer: COMMERCIAL

## 2024-01-30 VITALS
TEMPERATURE: 98 F | HEART RATE: 70 BPM | WEIGHT: 176.63 LBS | SYSTOLIC BLOOD PRESSURE: 110 MMHG | HEIGHT: 64 IN | DIASTOLIC BLOOD PRESSURE: 70 MMHG | OXYGEN SATURATION: 98 % | BODY MASS INDEX: 30.16 KG/M2

## 2024-01-30 DIAGNOSIS — M79.2 NEUROPATHIC PAIN OF FINGER OF RIGHT HAND: ICD-10-CM

## 2024-01-30 DIAGNOSIS — R06.02 SOB (SHORTNESS OF BREATH): ICD-10-CM

## 2024-01-30 DIAGNOSIS — Z00.00 WELL ADULT EXAM: Primary | ICD-10-CM

## 2024-01-30 DIAGNOSIS — M79.671 PAIN OF RIGHT HEEL: ICD-10-CM

## 2024-01-30 PROCEDURE — 3008F BODY MASS INDEX DOCD: CPT | Performed by: FAMILY MEDICINE

## 2024-01-30 PROCEDURE — 3078F DIAST BP <80 MM HG: CPT | Performed by: FAMILY MEDICINE

## 2024-01-30 PROCEDURE — 99385 PREV VISIT NEW AGE 18-39: CPT | Performed by: FAMILY MEDICINE

## 2024-01-30 PROCEDURE — 3074F SYST BP LT 130 MM HG: CPT | Performed by: FAMILY MEDICINE

## 2024-01-30 PROCEDURE — 99213 OFFICE O/P EST LOW 20 MIN: CPT | Performed by: FAMILY MEDICINE

## 2024-01-30 NOTE — PROGRESS NOTES
Camryn Olmstead is a 38 year old female.    Chief Complaint   Patient presents with    Physical       HPI:   Patient is here for wellness and is c/o intermittent right heel pain. She also c/o sharp stabing pain in her right fore finger for the last 2 years and it is random no assicaoted cause. Pressure on the  tip helps      Her heel pain has been more freuquent for the last one year but has been going on prior to that too no notable exacerbating factor and pain is very random    Pt has history of asthma and recently has issues with SOB and not sure if it is asthma or maybe anxiety    Patient Active Problem List   Diagnosis    Carpal tunnel syndrome     No current outpatient medications on file.      Past Medical History:   Diagnosis Date    Absence of menstruation 03/21/2008    Blood in stool 03/31/2010    Cataract, traumatic     Cataracts assoc w trauma, cataract extraction and lens implantation 1997    Chlamydial infection 12/04/2009    Dizziness 01/18/2010    Irregular menstrual cycle 03/21/2008    Popliteal cyst     L leg, surgical excision    Urinary tract infection 10/29/2009      Social History:  Social History     Socioeconomic History    Marital status: Single   Tobacco Use    Smoking status: Never    Smokeless tobacco: Never   Vaping Use    Vaping Use: Never used   Substance and Sexual Activity    Alcohol use: No     Alcohol/week: 0.0 standard drinks of alcohol     Comment: occasionally    Drug use: No   Other Topics Concern    Caffeine Concern Yes     Comment: 1 cup soda daily    Reaction to local anesthetic No     Family History   Problem Relation Age of Onset    Other (infertility) Other     Diabetes Father         oral meds    No Known Problems Mother         Allergies  No Known Allergies    REVIEW OF SYSTEMS:   As per HPI all other systems are negative    EXAM:   /70   Pulse 70   Temp 97.5 °F (36.4 °C) (Temporal)   Ht 5' 4\" (1.626 m)   Wt 176 lb 9.6 oz (80.1 kg)   SpO2 98%   BMI 30.31  kg/m²   GENERAL: well developed, well nourished,in no apparent distress  SKIN: no rashes,no suspicious lesions  HEENT: atraumatic, normocephalic,ears and throat are clear  NECK: supple,no adenopathy, normal thyroid, no nodules  LUNGS: normal rate without respiratory distress, lungs clear to auscultation  CARDIO: RRR without murmur, no edema  GI: normal bowel sounds, no masses, no hepatosplenomegaly, or tenderness  MUSCULOSKELETAL: no edema, normal strength and tone  NEURO:no gross notable deficiencies no notable sensory deficits  PSYCH: alert and oriented x 3 well-groomed, good eye contact, bright affect.    ASSESSMENT AND PLAN:     Encounter Diagnoses   Name Primary?    Well adult exam Yes    Pain of right heel     Neuropathic pain of finger of right hand     SOB (shortness of breath)        Orders Placed This Encounter   Procedures    CBC With Differential With Platelet    Comp Metabolic Panel (14)    Lipid Panel    Assay, Thyroid Stim Hormone    T4 FREE [866] [Q]    Connective Tissue Disease (ABAD) Screen, Reflex Specific Antibody       Meds & Refills for this Visit:  Requested Prescriptions      No prescriptions requested or ordered in this encounter       Imaging & Consults:  XR HEEL (CALCANEUS) (MIN 2 VIEWS), RIGHT (CPT=73650)    No follow-ups on file.  There are no Patient Instructions on file for this visit.

## 2024-02-01 ENCOUNTER — HOSPITAL ENCOUNTER (OUTPATIENT)
Dept: GENERAL RADIOLOGY | Age: 39
Discharge: HOME | End: 2024-02-01
Attending: FAMILY MEDICINE
Payer: COMMERCIAL

## 2024-02-01 ENCOUNTER — TELEPHONE (OUTPATIENT)
Dept: FAMILY MEDICINE CLINIC | Facility: CLINIC | Age: 39
End: 2024-02-01

## 2024-02-01 ENCOUNTER — NURSE ONLY (OUTPATIENT)
Dept: FAMILY MEDICINE CLINIC | Facility: CLINIC | Age: 39
End: 2024-02-01
Payer: COMMERCIAL

## 2024-02-01 DIAGNOSIS — Z00.00 WELL ADULT EXAM: ICD-10-CM

## 2024-02-01 DIAGNOSIS — M79.671 PAIN OF RIGHT HEEL: ICD-10-CM

## 2024-02-01 DIAGNOSIS — M79.671 PAIN OF RIGHT HEEL: Primary | ICD-10-CM

## 2024-02-01 DIAGNOSIS — M79.2 NEUROPATHIC PAIN OF FINGER OF RIGHT HAND: ICD-10-CM

## 2024-02-01 LAB
ALBUMIN SERPL-MCNC: 3.9 G/DL (ref 3.4–5)
ALBUMIN/GLOB SERPL: 1.1 {RATIO} (ref 1–2)
ALP LIVER SERPL-CCNC: 77 U/L
ALT SERPL-CCNC: 34 U/L
ANION GAP SERPL CALC-SCNC: 3 MMOL/L (ref 0–18)
AST SERPL-CCNC: 22 U/L (ref 15–37)
BASOPHILS # BLD AUTO: 0.02 X10(3) UL (ref 0–0.2)
BASOPHILS NFR BLD AUTO: 0.3 %
BILIRUB SERPL-MCNC: 0.3 MG/DL (ref 0.1–2)
BUN BLD-MCNC: 13 MG/DL (ref 9–23)
CALCIUM BLD-MCNC: 9.2 MG/DL (ref 8.5–10.1)
CHLORIDE SERPL-SCNC: 112 MMOL/L (ref 98–112)
CHOLEST SERPL-MCNC: 132 MG/DL (ref ?–200)
CO2 SERPL-SCNC: 27 MMOL/L (ref 21–32)
CREAT BLD-MCNC: 0.89 MG/DL
EGFRCR SERPLBLD CKD-EPI 2021: 86 ML/MIN/1.73M2 (ref 60–?)
EOSINOPHIL # BLD AUTO: 0.12 X10(3) UL (ref 0–0.7)
EOSINOPHIL NFR BLD AUTO: 1.9 %
ERYTHROCYTE [DISTWIDTH] IN BLOOD BY AUTOMATED COUNT: 11.5 %
FASTING PATIENT LIPID ANSWER: YES
FASTING STATUS PATIENT QL REPORTED: YES
GLOBULIN PLAS-MCNC: 3.4 G/DL (ref 2.8–4.4)
GLUCOSE BLD-MCNC: 100 MG/DL (ref 70–99)
HCT VFR BLD AUTO: 41.5 %
HDLC SERPL-MCNC: 43 MG/DL (ref 40–59)
HGB BLD-MCNC: 13.7 G/DL
IMM GRANULOCYTES # BLD AUTO: 0.01 X10(3) UL (ref 0–1)
IMM GRANULOCYTES NFR BLD: 0.2 %
LDLC SERPL CALC-MCNC: 78 MG/DL (ref ?–100)
LYMPHOCYTES # BLD AUTO: 2.21 X10(3) UL (ref 1–4)
LYMPHOCYTES NFR BLD AUTO: 35.9 %
MCH RBC QN AUTO: 30.3 PG (ref 26–34)
MCHC RBC AUTO-ENTMCNC: 33 G/DL (ref 31–37)
MCV RBC AUTO: 91.8 FL
MONOCYTES # BLD AUTO: 0.33 X10(3) UL (ref 0.1–1)
MONOCYTES NFR BLD AUTO: 5.4 %
NEUTROPHILS # BLD AUTO: 3.47 X10 (3) UL (ref 1.5–7.7)
NEUTROPHILS # BLD AUTO: 3.47 X10(3) UL (ref 1.5–7.7)
NEUTROPHILS NFR BLD AUTO: 56.3 %
NONHDLC SERPL-MCNC: 89 MG/DL (ref ?–130)
OSMOLALITY SERPL CALC.SUM OF ELEC: 294 MOSM/KG (ref 275–295)
PLATELET # BLD AUTO: 195 10(3)UL (ref 150–450)
POTASSIUM SERPL-SCNC: 4.6 MMOL/L (ref 3.5–5.1)
PROT SERPL-MCNC: 7.3 G/DL (ref 6.4–8.2)
RBC # BLD AUTO: 4.52 X10(6)UL
SODIUM SERPL-SCNC: 142 MMOL/L (ref 136–145)
T4 FREE SERPL-MCNC: 1 NG/DL (ref 0.8–1.7)
TRIGL SERPL-MCNC: 51 MG/DL (ref 30–149)
TSI SER-ACNC: 1.63 MIU/ML (ref 0.36–3.74)
VLDLC SERPL CALC-MCNC: 8 MG/DL (ref 0–30)
WBC # BLD AUTO: 6.2 X10(3) UL (ref 4–11)

## 2024-02-01 PROCEDURE — 86225 DNA ANTIBODY NATIVE: CPT | Performed by: FAMILY MEDICINE

## 2024-02-01 PROCEDURE — 85025 COMPLETE CBC W/AUTO DIFF WBC: CPT | Performed by: FAMILY MEDICINE

## 2024-02-01 PROCEDURE — 80053 COMPREHEN METABOLIC PANEL: CPT | Performed by: FAMILY MEDICINE

## 2024-02-01 PROCEDURE — 84439 ASSAY OF FREE THYROXINE: CPT | Performed by: FAMILY MEDICINE

## 2024-02-01 PROCEDURE — 73650 X-RAY EXAM OF HEEL: CPT | Performed by: FAMILY MEDICINE

## 2024-02-01 PROCEDURE — 80061 LIPID PANEL: CPT | Performed by: FAMILY MEDICINE

## 2024-02-01 PROCEDURE — 86038 ANTINUCLEAR ANTIBODIES: CPT | Performed by: FAMILY MEDICINE

## 2024-02-01 PROCEDURE — 84443 ASSAY THYROID STIM HORMONE: CPT | Performed by: FAMILY MEDICINE

## 2024-02-01 NOTE — PROGRESS NOTES
Patient to clinic for labs per david Delcid and lavender tube drawn left AC x 1 attempt    Patient left office in stable condition

## 2024-02-02 LAB
DSDNA IGG SERPL IA-ACNC: <0.6 IU/ML
ENA AB SER QL IA: 0.3 UG/L
ENA AB SER QL IA: NEGATIVE

## 2024-02-15 ENCOUNTER — RT VISIT (OUTPATIENT)
Dept: RESPIRATORY THERAPY | Facility: HOSPITAL | Age: 39
End: 2024-02-15
Attending: FAMILY MEDICINE
Payer: COMMERCIAL

## 2024-02-15 DIAGNOSIS — R06.02 SOB (SHORTNESS OF BREATH): ICD-10-CM

## 2024-02-15 PROCEDURE — 94010 BREATHING CAPACITY TEST: CPT | Performed by: INTERNAL MEDICINE

## 2024-02-15 PROCEDURE — 94726 PLETHYSMOGRAPHY LUNG VOLUMES: CPT | Performed by: INTERNAL MEDICINE

## 2024-02-15 PROCEDURE — 94729 DIFFUSING CAPACITY: CPT | Performed by: INTERNAL MEDICINE

## 2024-02-21 ENCOUNTER — OFFICE VISIT (OUTPATIENT)
Dept: OBGYN CLINIC | Facility: CLINIC | Age: 39
End: 2024-02-21
Payer: COMMERCIAL

## 2024-02-21 VITALS
BODY MASS INDEX: 30.32 KG/M2 | DIASTOLIC BLOOD PRESSURE: 68 MMHG | HEART RATE: 68 BPM | SYSTOLIC BLOOD PRESSURE: 110 MMHG | HEIGHT: 64 IN | WEIGHT: 177.63 LBS

## 2024-02-21 DIAGNOSIS — Z12.4 SCREENING FOR CERVICAL CANCER: ICD-10-CM

## 2024-02-21 DIAGNOSIS — Z01.419 WELL WOMAN EXAM WITH ROUTINE GYNECOLOGICAL EXAM: Primary | ICD-10-CM

## 2024-02-21 DIAGNOSIS — Z11.51 SCREENING FOR HUMAN PAPILLOMAVIRUS (HPV): ICD-10-CM

## 2024-02-21 DIAGNOSIS — Z30.431 IUD CHECK UP: ICD-10-CM

## 2024-02-21 PROCEDURE — 99385 PREV VISIT NEW AGE 18-39: CPT | Performed by: NURSE PRACTITIONER

## 2024-02-21 PROCEDURE — 88175 CYTOPATH C/V AUTO FLUID REDO: CPT | Performed by: NURSE PRACTITIONER

## 2024-02-21 PROCEDURE — 87625 HPV TYPES 16 & 18 ONLY: CPT | Performed by: NURSE PRACTITIONER

## 2024-02-21 PROCEDURE — 87624 HPV HI-RISK TYP POOLED RSLT: CPT | Performed by: NURSE PRACTITIONER

## 2024-02-21 NOTE — PROGRESS NOTES
Subjective:  Chief Complaint   Patient presents with    Physical     Annual     38 year old female  presents for annual.  Denies current complaints  Had Mirena IUD placed 2017  No complaints    Patient's last menstrual period was 2023 (approximate).  Hx Prior Abnormal Pap: No  Pap Date: 19  Pap Result Notes: wnl  Menarche: 14 (2024  1:54 PM)  Period Cycle (Days): 90 days (2024  1:54 PM)  Period Duration (Days): 2 (2024  1:54 PM)  Period Flow: light (2024  1:54 PM)  Use of Birth Control (if yes, specify type): Mirena IUD (2024  1:54 PM)  Hx Prior Abnormal Pap: No (2024  1:54 PM)  Pap Date: 19 (2024  1:54 PM)  Pap Result Notes: wnl (2024  1:54 PM)      Denies family history of breast, ovarian and colon CA.    Feeling safe at home.    Most Recent Immunizations   Administered Date(s) Administered    DTP 1992    FLU VAC QIV SPLIT 3 YRS AND OLDER (51638) 2019    Flucelvax 0.5ml Vaccine 2020    HEP B 1997    HIB 1989    Influenza(Afluria)0.5ml QIV PFS 2019    MMR 1992    OPV 1992    TD 2001      reports that she has never smoked. She has never used smokeless tobacco.   reports no history of alcohol use.    Past Medical History:   Diagnosis Date    Absence of menstruation 2008    Blood in stool 2010    Cataract, traumatic     Cataracts assoc w trauma, cataract extraction and lens implantation     Chlamydial infection 2009    Dizziness 2010    Irregular menstrual cycle 2008    Popliteal cyst     L leg, surgical excision    Urinary tract infection 10/29/2009     Past Surgical History:   Procedure Laterality Date    CATARACT EXTRACTION      and lens implantation    LEG/ANKLE SURGERY PROC UNLISTED      surgical excision (popliteal cyst L leg)       Review of Systems:  Pertinent items are noted in the HPI.    Objective:  /68   Pulse 68   Ht 64\"   Wt 177 lb 9.6 oz  (80.6 kg)   LMP 12/30/2023 (Approximate)   BMI 30.48 kg/m²    Physical Examination:  General appearance: Well dressed, well nourished in no apparent distress  Neurologic/Psychiatric: Alert and oriented to person, place and time, mood normal, affect appropriate  Head: Normocephalic without obvious deformity, atraumatic  Neck: No thyromegaly, supple, non-tender, no masses, no adenopathy  Lungs: Clear to auscultation bilaterally, no rales, wheezes or rhonchi  Breasts: Symmetric, non-tender, no masses, lesions, retraction, dimpling or discharge bilaterally, no axillary or supraclavicular lymphadenopathy  Heart: Regular rate and rhythm, no gallops or murmurs  Abdomen: Soft, non-tender, non-distended, no masses, no hepatosplenomegaly, no hernias, no inguinal lymphadenopathy  Pelvic:    External genitalia- Normal, Bartholin's, urethra, skeins glands normal   Vagina- + IUD strings visualized, No vaginal lesions, physiologic discharge   Cervix- No lesions, long/closed, no cervical motion tenderness   Uterus- Normal sized, non-tender, no masses   Adnexa-  Non-tender, no masses  Extremities: Non-tender, full range of motion, no clubbing, cyanosis or edema  Skin:  General inspection- no rashes, lesions or discoloration  Pap smear done    Assessment/Plan:  Normal well-woman exam.  Yearly mammogram at age 42.  Pap smear obtained.      Diagnoses and all orders for this visit:    Well woman exam with routine gynecological exam  - self breast exam discussed and encouraged    Screening for cervical cancer  -     ThinPrep PAP Smear; Future    Screening for human papillomavirus (HPV)  -     Hpv Dna  High Risk , Thin Prep Collect; Future    IUD check up  - IUD strings visualized  - discussed IUD removal by 12/2025       Return in about 1 year (around 2/21/2025) for annual weel woman exam or sooner if needed.

## 2024-02-22 ENCOUNTER — TELEPHONE (OUTPATIENT)
Dept: FAMILY MEDICINE CLINIC | Facility: CLINIC | Age: 39
End: 2024-02-22

## 2024-02-22 NOTE — TELEPHONE ENCOUNTER
Please see note below    Please see appt 02/15/24 - detailed report - Pulmonary Scan on 02/15/24 - Complete PFT report    Please advise, thank you

## 2024-02-22 NOTE — TELEPHONE ENCOUNTER
Advised patient of Dr. Quintanilla's note below. Patient verbalized understanding. No further questions at this time.

## 2024-02-23 LAB — HPV I/H RISK 1 DNA SPEC QL NAA+PROBE: POSITIVE

## 2024-02-27 NOTE — PROCEDURES
Findings:  Prebronchodilator FEV1 is 3.15L, 102% predicted.  Prebronchodilator FVC is 3.78L, 101% predicted.  FEV1/ FVC ratio is 0.83.  The flow-volume loop demonstrates a normal pattern.  The TLC is 5.49L, 111% predicted.  The residual volume 1.68L, 111% predicted.  The diffusion capacity is 95% predicted.     Impression:  There is no airway obstruction on spirometry and visualized on flow-volume loop.  Lung volumes are normal.  Diffusion capacity is normal   There are no previous pulmonary function tests available for comparison.     April Chavez MD  [facility]  Office: [phone]

## 2024-02-28 PROBLEM — R87.610 ASCUS WITH POSITIVE HIGH RISK HPV CERVICAL: Status: ACTIVE | Noted: 2024-02-28

## 2024-02-28 PROBLEM — R87.810 ASCUS WITH POSITIVE HIGH RISK HPV CERVICAL: Status: ACTIVE | Noted: 2024-02-28

## 2024-02-28 LAB
.: ABNORMAL
.: ABNORMAL
HPV16 DNA CVX QL PROBE+SIG AMP: NEGATIVE
HPV18 DNA CVX QL PROBE+SIG AMP: POSITIVE

## 2024-11-05 ENCOUNTER — HOSPITAL ENCOUNTER (EMERGENCY)
Age: 39
Discharge: HOME | End: 2024-11-05
Attending: EMERGENCY MEDICINE
Payer: COMMERCIAL

## 2024-11-05 ENCOUNTER — APPOINTMENT (OUTPATIENT)
Dept: ULTRASOUND IMAGING | Age: 39
End: 2024-11-05
Payer: COMMERCIAL

## 2024-11-05 ENCOUNTER — HOSPITAL ENCOUNTER (OUTPATIENT)
Age: 39
Discharge: ANOTHER HOSPITAL | End: 2024-11-05
Payer: COMMERCIAL

## 2024-11-05 VITALS
HEIGHT: 64 IN | SYSTOLIC BLOOD PRESSURE: 117 MMHG | WEIGHT: 165 LBS | RESPIRATION RATE: 16 BRPM | BODY MASS INDEX: 28.17 KG/M2 | TEMPERATURE: 97 F | HEART RATE: 76 BPM | DIASTOLIC BLOOD PRESSURE: 78 MMHG | OXYGEN SATURATION: 97 %

## 2024-11-05 VITALS
DIASTOLIC BLOOD PRESSURE: 75 MMHG | TEMPERATURE: 98 F | HEART RATE: 67 BPM | SYSTOLIC BLOOD PRESSURE: 125 MMHG | RESPIRATION RATE: 18 BRPM | OXYGEN SATURATION: 100 %

## 2024-11-05 DIAGNOSIS — S86.111A GASTROCNEMIUS MUSCLE STRAIN, RIGHT, INITIAL ENCOUNTER: Primary | ICD-10-CM

## 2024-11-05 DIAGNOSIS — R26.9 GAIT DISTURBANCE: ICD-10-CM

## 2024-11-05 DIAGNOSIS — M79.661 RIGHT CALF PAIN: Primary | ICD-10-CM

## 2024-11-05 PROCEDURE — 99284 EMERGENCY DEPT VISIT MOD MDM: CPT

## 2024-11-05 PROCEDURE — 99213 OFFICE O/P EST LOW 20 MIN: CPT | Performed by: NURSE PRACTITIONER

## 2024-11-05 PROCEDURE — 93971 EXTREMITY STUDY: CPT

## 2024-11-05 RX ORDER — NAPROXEN 500 MG/1
500 TABLET ORAL 2 TIMES DAILY PRN
Qty: 30 TABLET | Refills: 0 | Status: SHIPPED | OUTPATIENT
Start: 2024-11-05

## 2024-11-05 NOTE — ED PROVIDER NOTES
Patient Seen in: [facility]      History     Chief Complaint   Patient presents with    Leg Pain     Stated Complaint: right leg issues: pain, swelling    Subjective:   HPI  39-year-old female presents to me care with complaints of right calf pain after running on Saturday and noticed increasing swelling and tenderness to the calf.  Patient is conference for possible blood clot.  No history of blood clots.  Patient is on birth control.  Patient has no other issues complaints concerns.  The patient's medication list, past medical history and social history elements as listed in today's nurse's notes were reviewed and agreed (except as otherwise stated in the HPI).  The patient's family history reviewed and determined to be noncontributory to the presenting problem.      Objective:     Past Medical History:    Absence of menstruation    Blood in stool    Cataract, traumatic    Cataracts assoc w trauma, cataract extraction and lens implantation 1997    Chlamydial infection    Dizziness    Irregular menstrual cycle    Popliteal cyst    L leg, surgical excision    Urinary tract infection              Past Surgical History:   Procedure Laterality Date    Cataract extraction  1997    and lens implantation    Leg/ankle surgery proc unlisted      surgical excision (popliteal cyst L leg)                Social History     Socioeconomic History    Marital status: Single   Tobacco Use    Smoking status: Never    Smokeless tobacco: Never   Vaping Use    Vaping status: Never Used   Substance and Sexual Activity    Alcohol use: No     Alcohol/week: 0.0 standard drinks of alcohol     Comment: occasionally    Drug use: No    Sexual activity: Yes     Partners: Male     Birth control/protection: Mirena, I.U.D.   Other Topics Concern    Caffeine Concern Yes     Comment: 1 cup soda daily    Exercise Yes    Seat Belt Yes    Reaction to local anesthetic No              Review of Systems    Positive for stated complaint: right  leg issues: pain, swelling  Other systems are as noted in HPI.  Constitutional and vital signs reviewed.      All other systems reviewed and negative except as noted above.    Physical Exam     ED Triage Vitals [11/05/24 1309]   /75   Pulse 67   Resp 18   Temp 97.9 °F (36.6 °C)   Temp src Temporal   SpO2 100 %   O2 Device None (Room air)       Current Vitals:   Vital Signs  BP: 125/75  Pulse: 67  Resp: 18  Temp: 97.9 °F (36.6 °C)  Temp src: Temporal    Oxygen Therapy  SpO2: 100 %  O2 Device: None (Room air)        Physical Exam  GENERAL: The patient is well-developed well-nourished nontoxic, non-ill-appearing  HEENT: Normocephalic.  Atraumatic.  Extraocular motions are intact  NECK: Supple.  No meningitic signs are noted.   CHEST/LUNGS: Clear to auscultation.  There is no respiratory distress noted.  HEART/CARDIOVASCULAR: Regular.  There is no tachycardia.   SKIN: There is no rash.  Musculoskeletal: Tenderness is noted to the right calf upon palpation sensations intact +  NEURO: The patient is awake, alert, and oriented.  The patient is cooperative.    ED Course   Labs Reviewed - No data to display            MDM   Patient is go to the ER for advanced imaging that was not available here in the immediate care.  Patient is in stable conditions vital signs are reassuring.  Patient is not tachycardic or hypoxic.  Please note that this report has been produced using speech recognition software and may contain errors related to that system including, but not limited to, errors in grammar, punctuation, and spelling, as well as words and phrases that possibly may have been recognized inappropriately.  If there are any questions or concerns, contact the dictating provider for clarification.      Medical Decision Making      Disposition and Plan     Clinical Impression:  1. Right calf pain         Disposition:  Ic to ed  11/5/2024  1:15 pm    Follow-up:  [facility] in [place]  [address]  [facility] [address]  [phone]              Medications Prescribed:  There are no discharge medications for this patient.          Supplementary Documentation:

## 2024-11-05 NOTE — DISCHARGE INSTRUCTIONS
Please go directly to [facility] for further evaluation of your calf pain and swelling.  You may need advanced imaging that is not available here in the immediate care

## 2024-11-05 NOTE — ED PROVIDER NOTES
History     Chief Complaint   Patient presents with    Deep Vein Thrombosis       Subjective:   HPI    Camryn Olmstead, 39 year old female with notable medical history of n/a who presents with Right calf pain. Patient reports running over the weekend and having sudden onset pain to her Right calf that worsens with walking / pressure. Patient attempted home remedies including ice/her and massage w/o resolution. Patient was evaluated at [facility] earlier today and advised to go to ER to r/o DVT. Denies CP, palpitations, SOB, KING, back pain.        Objective:   Past Medical History:    Absence of menstruation    Blood in stool    Cataract, traumatic    Cataracts assoc w trauma, cataract extraction and lens implantation 1997    Chlamydial infection    Dizziness    Irregular menstrual cycle    Popliteal cyst    L leg, surgical excision    Urinary tract infection              Past Surgical History:   Procedure Laterality Date    Cataract extraction  1997    and lens implantation    Leg/ankle surgery proc unlisted      surgical excision (popliteal cyst L leg)                Social History     Socioeconomic History    Marital status: Single   Tobacco Use    Smoking status: Never    Smokeless tobacco: Never   Vaping Use    Vaping status: Never Used   Substance and Sexual Activity    Alcohol use: No     Alcohol/week: 0.0 standard drinks of alcohol     Comment: occasionally    Drug use: No    Sexual activity: Yes     Partners: Male     Birth control/protection: Mirena, I.U.D.   Other Topics Concern    Caffeine Concern Yes     Comment: 1 cup soda daily    Exercise Yes    Seat Belt Yes    Reaction to local anesthetic No              Medications Ordered Prior to Encounter[1]      Review of Systems   Musculoskeletal:         Right calf pain   All other systems reviewed and are negative.        Constitutional and vital signs reviewed.      All other systems reviewed and negative except as noted above.    I have reviewed the family  history, social history, allergies, and outpatient medications.     History reviewed from EMR: Encounters, problem list, allergies, medications      Physical Exam     ED Triage Vitals [11/05/24 1342]   /78   Pulse 76   Resp 16   Temp 97 °F (36.1 °C)   Temp src Temporal   SpO2 97 %   O2 Device None (Room air)       Current:/78   Pulse 76   Temp 97 °F (36.1 °C) (Temporal)   Resp 16   Ht 162.6 cm (5' 4\")   Wt 74.8 kg   SpO2 97%   BMI 28.32 kg/m²       Physical Exam  Vitals and nursing note reviewed.   Constitutional:       General: She is not in acute distress.     Appearance: Normal appearance. She is normal weight. She is not ill-appearing or toxic-appearing.   HENT:      Head: Normocephalic and atraumatic.      Right Ear: External ear normal.      Left Ear: External ear normal.      Nose: Nose normal.      Mouth/Throat:      Mouth: Mucous membranes are moist.   Eyes:      Extraocular Movements: Extraocular movements intact.      Conjunctiva/sclera: Conjunctivae normal.      Pupils: Pupils are equal, round, and reactive to light.   Cardiovascular:      Rate and Rhythm: Normal rate.      Pulses: Normal pulses.   Pulmonary:      Effort: Pulmonary effort is normal. No respiratory distress.   Musculoskeletal:         General: No swelling or signs of injury. Normal range of motion.      Cervical back: Normal range of motion and neck supple.      Right lower leg: Swelling and tenderness present. No deformity, lacerations or bony tenderness. No edema.      Right ankle:      Right Achilles Tendon: Normal. No tenderness or defects.      Comments: Mild swelling to Right calf compared to Left calf w/o erythema or warmth. +tenderness throughout calf. Achilles w/o pain or defect.   Skin:     General: Skin is warm and dry.      Capillary Refill: Capillary refill takes less than 2 seconds.      Coloration: Skin is not jaundiced.   Neurological:      General: No focal deficit present.      Mental Status: She is  alert and oriented to person, place, and time. Mental status is at baseline.   Psychiatric:         Mood and Affect: Mood normal.         Behavior: Behavior normal.         Thought Content: Thought content normal.         Judgment: Judgment normal.            ED Course     Labs Reviewed - No data to display  US VENOUS DOPPLER LEG RIGHT - DIAG IMG (CPT=93971)   Final Result   PROCEDURE:  US VENOUS DOPPLER LEG RIGHT - DIAG IMG (CPT=93971)       COMPARISON:  None.       INDICATIONS:  eval for DVT, right lower extremity pain       TECHNIQUE:  Real time, grey scale, and duplex ultrasound was used to    evaluate the lower extremity venous system. B-mode two-dimensional images    of the vascular structures, Doppler spectral analysis, and color flow.     Doppler imaging were performed.  The    following veins were imaged:  Common, deep, and superficial femoral,    popliteal, sapheno-femoral junction, posterior tibial veins, and the    contralateral common femoral vein.       PATIENT STATED HISTORY: (As transcribed by Technologist)  Right leg pain    for days.            FINDINGS:     EXTREMITY EXAMINED:  Right lower extremity   SAPHENOFEMORAL JUNCTION:  No reflux.   THROMBI:  None visible.   COMPRESSION:  Normal compressibility, phasicity, and augmentation.   OTHER:  Negative.                         =====   CONCLUSION:  Unremarkable right lower extremity venous ultrasound    examination.           LOCATION:  [facility]               Dictated by (CST): Ethan Luis MD on 11/05/2024 at 2:27 PM        Finalized by (CST): Ethan Luis MD on 11/05/2024 at 2:28 PM             Vitals:    11/05/24 1342   BP: 117/78   Pulse: 76   Resp: 16   Temp: 97 °F (36.1 °C)   TempSrc: Temporal   SpO2: 97%   Weight: 74.8 kg   Height: 162.6 cm (5' 4\")            [facility]        Camryn Olmstead, 39 year old female with medical history as noted above who presents with Right calf pain / injury   -Patient in NAD, VSS   - gastrocnemius strain / tear vs DVT  vs other   - US negative for DVT   - Lidocaine patch and ace wrap (for compression) applied to lower leg   - Supportive care discussed   - Patient reports having crutches at home   - Orthopedic contact provided (if needed)        ** See ED course below for additional information on care provided / interventions / notable events throughout patient's encounter.         ** I have independently reviewed the radiology images, clinical lab results, and ECG tracings as described above (if applicable)    ** Concerning co-morbidities possibly affecting complaint / care: n/a    ** See below for home care instructions (if applicable)          Medical Decision Making  Amount and/or Complexity of Data Reviewed  Radiology: ordered and independent interpretation performed. Decision-making details documented in ED Course.    Risk  OTC drugs.  Prescription drug management.        Disposition and Plan     Clinical Impression:  1. Gastrocnemius muscle strain, right, initial encounter    2. Gait disturbance         Disposition:  Discharge  11/5/2024  3:00 pm    Follow-up:  Hernesto Nieto MD  [address] [facility] [address]  [facility] [id number] [phone]    Follow up  Orthopedic contact  (if needed)    Pooja Carrillo PA  [address] [place] [address] [id number] [phone]    Follow up  Orthopedic contact (if needed)          Medications Prescribed:  Current Discharge Medication List        START taking these medications    Details   naproxen 500 MG Oral Tab Take 1 tablet (500 mg total) by mouth 2 (two) times daily as needed (pain).  Qty: 30 tablet, Refills: 0             The above patient (and/or guardian) was made aware that an appropriate evaluation has been performed, and that no additional testing is required at this time. In my medical judgment, there is currently no evidence of an immediate life-threatening or surgical condition, therefore discharge is indicated at this time. The patient (and/or guardian) was advised  that a small risk still exists that a serious condition could develop. The patient was instructed to arrange close follow-up with their primary care provider (or the referral provider given today). The patient received written and verbal instructions regarding their condition / concerns, demonstrated understanding, and is agreement with the outpatient treatment plan.        Home care instructions:    Supportive Care Measures:   - Naproxen (440mg - 500mg) or Ibuprofen (600mg) as needed for pain   - Apply ace wrap (or compression sleeve) throughout the day   - Avoid pressure to back of lower leg   - Elevate leg whenever possible   - Ice as tolerated for pain / swelling   - You may benefit from over-the-counter topical pain medication such as: Voltaren (Diclofenac), Icy/Hot, Biofreeze, Bengay, Lidocaine, etc.   - Avoid excessive standing, running, flexion / extension of lower leg until symptoms resolve   - You may benefit from Epsom salt soaks in warm water throughout the day - 20min at a time   - You may notice some bruising develop over the next few days which is normal   - Follow up with orthopedic specialist if symptoms do not start to improve through the weekend (let them know you are an ER follow up patient)      Clinton Schwartz, DNP, APRN, AGACNP-BC, FNP-C, CNL  Adult-Gerontology Acute Care & Family Nurse Practitioner  [facility]                   [1]   No current facility-administered medications on file prior to encounter.     No current outpatient medications on file prior to encounter.

## 2024-11-05 NOTE — ED INITIAL ASSESSMENT (HPI)
Right calf pain started after running on Saturday. Felt warm and swollen Saturday. Concerned for blood clot.

## 2024-11-05 NOTE — ED PROVIDER NOTES
39-year-old female presents to the emergency department for an ultrasound of her right lower extremity.  She was initially seen in our [facility] and sent to rule out DVT.  She was seen by me as well as our nurse practitioner.  Patient states pain began when she was doing a 5K.  Patient states that she typically runs about a mile a day and has not had any issues.  She had sudden onset of pain.  No previous knee problems.  No previous history of DVT or PE.  Patient's ultrasound was negative for DVT.  On exam she is tender along her gastrocnemius.  She has equal pulses throughout with good capillary refill.  Most likely this is a muscle strain or muscle tear.  She is advised to ice and elevate.  I agree with the assessment and plan    I reviewed that chart and discussed the case.  I have examined the patient and noted neurovascularly intact     I did the substantive portion of the medical decision making.     I agree with the following clinical impression(s):  Gastrocnemius muscle strain, right, initial encounter  (primary encounter diagnosis)  Gait disturbance.     I agree with the plan as noted.

## 2024-11-11 ENCOUNTER — PATIENT MESSAGE (OUTPATIENT)
Dept: FAMILY MEDICINE CLINIC | Facility: CLINIC | Age: 39
End: 2024-11-11

## 2025-02-03 ENCOUNTER — OFFICE VISIT (OUTPATIENT)
Dept: FAMILY MEDICINE CLINIC | Facility: CLINIC | Age: 40
End: 2025-02-03
Payer: COMMERCIAL

## 2025-02-03 VITALS
BODY MASS INDEX: 29.33 KG/M2 | DIASTOLIC BLOOD PRESSURE: 68 MMHG | HEIGHT: 64.17 IN | OXYGEN SATURATION: 100 % | SYSTOLIC BLOOD PRESSURE: 102 MMHG | WEIGHT: 171.81 LBS | HEART RATE: 62 BPM | TEMPERATURE: 97 F | RESPIRATION RATE: 18 BRPM

## 2025-02-03 DIAGNOSIS — Z00.00 WELL ADULT EXAM: Primary | ICD-10-CM

## 2025-02-03 PROCEDURE — 99395 PREV VISIT EST AGE 18-39: CPT | Performed by: FAMILY MEDICINE

## 2025-02-03 NOTE — PROGRESS NOTES
Chief Complaint   Patient presents with    Well Adult    Physical     Reviewed Preventative/Wellness form with patient.    Pt is fasting      HPI  Patient here for wellness. Wants to know how to lose weight. She is exercising and eating healthy but not calorie restricting    ROS  Physical Exam  Constitutional:       Appearance: Normal appearance.   HEENT:      Head: Normocephalic and atraumatic.      Eyes: PERRLA no notable nystagmus     Ears: normal on observation     Nose: Nose normal.      Mouth: Mucous membranes are moist.      Neck: no masses no bruit  Cardiovascular:      Rate and Rhythm: Normal rate and regular rhythm.   Pulmonary:      Effort: Pulmonary effort is normal.      Breath sounds: Normal breath sounds.   Abdominal:      General: Bowel sounds are normal.      Palpations: Abdomen is soft. There is no mass.   Musculoskeletal:         General: Normal range of motion.      Cervical back: Normal range of motion.   Skin:     General: Skin is warm and dry.   Neurological:      General: No focal deficit present.      Mental Status: She is alert and oriented to person, place, and time.   Psychiatric:         Mood and Affect: Mood normal.         Thought Content: Thought content normal.       Past Medical History:    Absence of menstruation    Blood in stool    Cataract, traumatic    Cataracts assoc w trauma, cataract extraction and lens implantation 1997    Chlamydial infection    Dizziness    Irregular menstrual cycle    Popliteal cyst    L leg, surgical excision    Urinary tract infection       Past Surgical History:   Procedure Laterality Date    Cataract extraction  1997    and lens implantation    Leg/ankle surgery proc unlisted      surgical excision (popliteal cyst L leg)       Social History     Socioeconomic History    Marital status: Single   Tobacco Use    Smoking status: Never     Passive exposure: Never    Smokeless tobacco: Never   Vaping Use    Vaping status: Never Used   Substance and Sexual  Activity    Alcohol use: No     Alcohol/week: 0.0 standard drinks of alcohol     Comment: occasionally    Drug use: No    Sexual activity: Yes     Partners: Male     Birth control/protection: Mirena, I.U.D.   Other Topics Concern    Caffeine Concern Yes     Comment: 1 cup soda daily    Exercise Yes    Seat Belt Yes    Reaction to local anesthetic No       Family History   Problem Relation Age of Onset    Other (infertility) Other     Diabetes Father         oral meds    No Known Problems Mother         Medications Ordered Prior to Encounter[1]      Objective  Vitals:    02/03/25 0924   BP: 102/68   Pulse: 62   Resp: 18   Temp: 97 °F (36.1 °C)   TempSrc: Temporal   SpO2: 100%   Weight: 171 lb 12.8 oz (77.9 kg)   Height: 5' 4.17\" (1.63 m)     Physical Exam  Constitutional:       Appearance: Normal appearance.   HEENT:      Head: Normocephalic and atraumatic.      Eyes: PERRLA no notable nystagmus     Ears: normal on observation     Nose: Nose normal.      Mouth: Mucous membranes are moist.      Neck: no masses no bruit  Cardiovascular:      Rate and Rhythm: Normal rate and regular rhythm.   Pulmonary:      Effort: Pulmonary effort is normal.      Breath sounds: Normal breath sounds.   Abdominal:      General: Bowel sounds are normal.      Palpations: Abdomen is soft. There is no mass.   Musculoskeletal:         General: Normal range of motion.      Cervical back: Normal range of motion.   Skin:     General: Skin is warm and dry.   Neurological:      General: No focal deficit present.      Mental Status: She is alert and oriented to person, place, and time.   Psychiatric:         Mood and Affect: Mood normal.         Thought Content: Thought content normal.       Assessment and Plan  Camryn was seen today for well adult and physical.    Diagnoses and all orders for this visit:    Well adult exam  -     Cancel: CBC With Differential With Platelet; Future  -     Cancel: Comp Metabolic Panel (14); Future  -     Cancel:  TSH and Free T4; Future  -     Cancel: Lipid Panel; Future  -     CBC With Differential With Platelet  -     Comp Metabolic Panel (14)  -     Lipid Panel  -     TSH and Free T4       If unable to lose weight can refer to weight loss clinic. Patient agrees to message if she continues to struggle  Thinks she had Tdap with last child will check with OB/gyn  Declines all other vaccinations and is aware of risk  Follow up  No follow-ups on file.      Patient Instructions  There are no Patient Instructions on file for this visit.       Fabiano De Leon MD       This note was created by Dragon voice recognition. Errors in content may be related to improper recognition by the system; efforts to review and correct have been done but errors may still exist. Please be advised the primary purpose of this note is for me to communicate medical care. Standard sentence structure is not always used. Medical terminology and medical abbreviations may be used. There may be grammatical, typographical, and automated fill ins that may have errors missed in proofreading.          [1]   Current Outpatient Medications on File Prior to Visit   Medication Sig Dispense Refill    naproxen 500 MG Oral Tab Take 1 tablet (500 mg total) by mouth 2 (two) times daily as needed (pain). (Patient not taking: Reported on 2/3/2025) 30 tablet 0     No current facility-administered medications on file prior to visit.

## 2025-02-04 LAB
ABSOLUTE BASOPHILS: 22 CELLS/UL (ref 0–200)
ABSOLUTE EOSINOPHILS: 112 CELLS/UL (ref 15–500)
ABSOLUTE LYMPHOCYTES: 2050 CELLS/UL (ref 850–3900)
ABSOLUTE MONOCYTES: 246 CELLS/UL (ref 200–950)
ABSOLUTE NEUTROPHILS: 3170 CELLS/UL (ref 1500–7800)
ALBUMIN/GLOBULIN RATIO: 1.9 (CALC) (ref 1–2.5)
ALBUMIN: 4.3 G/DL (ref 3.6–5.1)
ALKALINE PHOSPHATASE: 69 U/L (ref 31–125)
ALT: 16 U/L (ref 6–29)
AST: 17 U/L (ref 10–30)
BASOPHILS: 0.4 %
BILIRUBIN, TOTAL: 0.4 MG/DL (ref 0.2–1.2)
BUN: 9 MG/DL (ref 7–25)
CALCIUM: 8.8 MG/DL (ref 8.6–10.2)
CARBON DIOXIDE: 27 MMOL/L (ref 20–32)
CHLORIDE: 104 MMOL/L (ref 98–110)
CHOL/HDLC RATIO: 2.8 (CALC)
CHOLESTEROL, TOTAL: 131 MG/DL
CREATININE: 0.64 MG/DL (ref 0.5–0.97)
EGFR: 117 ML/MIN/1.73M2
EOSINOPHILS: 2 %
GLOBULIN: 2.3 G/DL (CALC) (ref 1.9–3.7)
GLUCOSE: 85 MG/DL (ref 65–99)
HDL CHOLESTEROL: 47 MG/DL
HEMATOCRIT: 39.8 % (ref 35–45)
HEMOGLOBIN: 13.1 G/DL (ref 11.7–15.5)
LDL-CHOLESTEROL: 71 MG/DL (CALC)
LYMPHOCYTES: 36.6 %
MCH: 30.8 PG (ref 27–33)
MCHC: 32.9 G/DL (ref 32–36)
MCV: 93.6 FL (ref 80–100)
MONOCYTES: 4.4 %
MPV: 11.5 FL (ref 7.5–12.5)
NEUTROPHILS: 56.6 %
NON-HDL CHOLESTEROL: 84 MG/DL (CALC)
PLATELET COUNT: 189 THOUSAND/UL (ref 140–400)
POTASSIUM: 4.1 MMOL/L (ref 3.5–5.3)
PROTEIN, TOTAL: 6.6 G/DL (ref 6.1–8.1)
RDW: 12.4 % (ref 11–15)
RED BLOOD CELL COUNT: 4.25 MILLION/UL (ref 3.8–5.1)
SODIUM: 140 MMOL/L (ref 135–146)
T4, FREE: 1.3 NG/DL (ref 0.8–1.8)
TRIGLYCERIDES: 57 MG/DL
TSH: 1.11 MIU/L
WHITE BLOOD CELL COUNT: 5.6 THOUSAND/UL (ref 3.8–10.8)

## 2025-05-18 ENCOUNTER — APPOINTMENT (OUTPATIENT)
Dept: GENERAL RADIOLOGY | Age: 40
End: 2025-05-18
Attending: NURSE PRACTITIONER

## 2025-05-18 ENCOUNTER — HOSPITAL ENCOUNTER (OUTPATIENT)
Age: 40
Discharge: HOME | End: 2025-05-18

## 2025-05-18 VITALS
RESPIRATION RATE: 16 BRPM | TEMPERATURE: 98 F | DIASTOLIC BLOOD PRESSURE: 86 MMHG | SYSTOLIC BLOOD PRESSURE: 124 MMHG | OXYGEN SATURATION: 100 % | HEART RATE: 56 BPM

## 2025-05-18 DIAGNOSIS — S69.90XA FINGER INJURY: Primary | ICD-10-CM

## 2025-05-18 DIAGNOSIS — S69.92XA FINGERNAIL INJURY, LEFT, INITIAL ENCOUNTER: ICD-10-CM

## 2025-05-18 PROCEDURE — A4570 SPLINT: HCPCS | Performed by: NURSE PRACTITIONER

## 2025-05-18 PROCEDURE — 99213 OFFICE O/P EST LOW 20 MIN: CPT | Performed by: NURSE PRACTITIONER

## 2025-05-18 PROCEDURE — 73140 X-RAY EXAM OF FINGER(S): CPT | Performed by: NURSE PRACTITIONER

## 2025-05-18 NOTE — ED INITIAL ASSESSMENT (HPI)
Pt with left 5th digit pain from a barbell lifting injury that happened Thursday. Pt had acrylic nail which ripped off with her nail from barbell injury.

## 2025-05-19 NOTE — ED PROVIDER NOTES
Patient Seen in: [facility]      History     Chief Complaint   Patient presents with    Finger Injury     Stated Complaint: Nail Bed Broke    Subjective:   HPI  History of Present Illness            Patient is a 39-year-old female who is here today with left fifth digit pain, swelling after she picked up a barbell, when she lifted it up it slipped, causing her nail to be ripped off of the nailbed, she is now having a significant amount of pain in the digit.  Patient is up-to-date on tetanus      Objective:     Past Medical History:    Absence of menstruation    Blood in stool    Cataract, traumatic    Cataracts assoc w trauma, cataract extraction and lens implantation 1997    Chlamydial infection    Dizziness    Irregular menstrual cycle    Popliteal cyst    L leg, surgical excision    Urinary tract infection              Past Surgical History:   Procedure Laterality Date    Cataract extraction  1997    and lens implantation    Leg/ankle surgery proc unlisted      surgical excision (popliteal cyst L leg)                Social History     Socioeconomic History    Marital status: Single   Tobacco Use    Smoking status: Never     Passive exposure: Never    Smokeless tobacco: Never   Vaping Use    Vaping status: Never Used   Substance and Sexual Activity    Alcohol use: No     Alcohol/week: 0.0 standard drinks of alcohol     Comment: occasionally    Drug use: No    Sexual activity: Yes     Partners: Male     Birth control/protection: Mirena, I.U.D.   Other Topics Concern    Caffeine Concern Yes     Comment: 1 cup soda daily    Exercise Yes    Seat Belt Yes    Reaction to local anesthetic No     Social Drivers of Health     Food Insecurity: No Food Insecurity (2/3/2025)    NCSS - Food Insecurity     Worried About Running Out of Food in the Last Year: No     Ran Out of Food in the Last Year: No   Transportation Needs: No Transportation Needs (2/3/2025)    NCSS - Transportation     Lack of Transportation: No    Housing Stability: Not At Risk (2/3/2025)    NCSS - Housing/Utilities     Has Housing: Yes     Worried About Losing Housing: No     Unable to Get Utilities: No              Review of Systems    Positive for stated complaint: Nail Bed Broke  Other systems are as noted in HPI.  Constitutional and vital signs reviewed.      All other systems reviewed and negative except as noted above.                  Physical Exam     ED Triage Vitals [05/18/25 1434]   /86   Pulse 56   Resp 16   Temp 98.1 °F (36.7 °C)   Temp src Oral   SpO2 100 %   O2 Device None (Room air)       Current Vitals:   Vital Signs  BP: 124/86  Pulse: 56  Resp: 16  Temp: 98.1 °F (36.7 °C)  Temp src: Oral    Oxygen Therapy  SpO2: 100 %  O2 Device: None (Room air)          Physical Exam  VS: Vital signs reviewed. O2 saturation within normal limits for this patient     General: Patient is awake and alert, oriented to person, place and time. Not in acute distress.      HEENT: Head is normocephalic atraumatic. Pupils reactive bilaterally.  EOMs intact.      Lungs: No respiratory distress noted    Extremities: No edema.  Pulses 2+ extremities.   Brisk capillary refill noted.  Patient has pain to the distal aspect of the proximal phalanx, left fifth digit.  She also has a completely avulsed nail from that digit.  There is no bleeding    Skin: Normal skin turgor     CNS: Moves all 4 extremities.  Interacts appropriately.  No tremor.  No gait abnormality    Differential diagnosis: Contusion, fracture, sprain            ED Course   Labs Reviewed - No data to display       Results            I have personally  reviewed available prior medical records for any recent pertinent discharge summaries/testing. Patient/family updated on results and plan, a verbalized understanding and agreement with the plan.  I explained to the patient that emergent conditions may arise and to go to the ER for new, worsening or any persistent conditions. I've explained the  importance of taking all medicatons as prescribed, follow up, and return precuations,  All questions answered.    Please note that this report has been produced using speech recognition software and may contain errors related to that system including, but not limited to, errors in grammar, punctuation, and spelling, as well as words and phrases that possibly may have been recognized inappropriately.  If there are any questions or concerns, contact the dictating provider for clarification.                  MDM      Patient presents for injury after a barbell fell out of her hand, causing her nail to become detached from the nailbed.  This was a mechanical fall, no syncope or head injury.  No shortness of breath or chest pain and the patient has oxygen saturation which is within normal limits for this patient.  X-rays were performed which did not reveal any acute fracture or dislocation.  Presentation is clinically consistent with contusion.  Patient was placed in a finger splint, neurovascularly intact before and after Instructions given on Rice therapy and over-the-counter medications for pain management. Recommend close follow-up with PCP.  Discussed possibility of missed occult fracture and need for repeat imaging if pain is persistent after 2 weeks.  Return to ED precautions discussed with the patient.          Medical Decision Making        Disposition and Plan     Clinical Impression:  1. Finger injury    2. Fingernail injury, left, initial encounter         Disposition:  Discharge  5/18/2025  3:27 pm    Follow-up:  Fabiano De Leon MD  [address] [facility] [address]  [phone]                Medications Prescribed:  There are no discharge medications for this patient.            Supplementary Documentation:

## (undated) NOTE — MR AVS SNAPSHOT
[place]  [address]  [phone]               Thank you for choosing us for your health care visit with Nurse.   We are glad to serve you and happy to provide you with this summary of your vis HIV Ag Ab Combo    Complete by: Mar 01, 2017 (Approximate)    Assoc Dx:  Encounter for supervision of other normal pregnancy in first trimester [Z34.81]           HCV Antibody    Complete by:   Mar 01, 2017 (Approximate)    Assoc Dx:  Encounter for [place] Young

## (undated) NOTE — MR AVS SNAPSHOT
[facility] [address]  [phone]               Thank you for choosing us for your health care visit with Ammon Green MD.  We are glad to serve you and happy to provide you with this summary of your

## (undated) NOTE — MR AVS SNAPSHOT
[facility] SPECIALTY [facility] - [place] [address] [address] [phone]               Thank you for choosing us for your health care visit with Gwyn Delgadillo MD.  We are glad to serve you and happy to provide you with this summary of y [email or web address]

## (undated) NOTE — MR AVS SNAPSHOT
[address]\""  [phone]               Thank you for choosing us for your health care visit with Frantz Madrid CNM.   We are glad to serve you and happy to provide you with this sum

## (undated) NOTE — ED AVS SNAPSHOT
[facility] AND [facility] in [facility] [place] [address] [address]    Phone:  [phone]    Fax:  [phone]           Rod Reyna   MRN: [id number]    Department:  [facility] in [place]   Date of Visit: Take medicines as prescribed        Disclosure     Insurance plans vary and the physician(s) referred by the Immediate Care may not be covered by your plan. It is possible that the physician may not participate in your health insurance plan.   This may res IF THERE IS ANY CHANGE OR WORSENING OF YOUR CONDITION, CALL YOUR PRIMARY CARE PHYSICIAN AT ONCE OR GO TO THE EMERGENCY DEPARTMENT.     If you have been prescribed any medication(s), please fill your prescription right away and begin taking the medication(s) harming yourself, contact [facility] at [phone]. - If you don’t have insurance, [employer] has partnered with Patient [address] to help you get signed up for insurance coverage.   Patient [place]

## (undated) NOTE — IP AVS SNAPSHOT
Patient Demographics     Address Phone E-mail Address    [address] [phone] (Home) [email or web address]      Emergency Contact(s)     Name Relation Home Work Mobile    Jovanny Boston Spouse [phone]        Allergies as Future Appointments        Provider Anand Morel    4/11/2017 5:00 PM Viviana Kerns MD [facility] [place], [address] [facility]    5/8/2017 5:50 PM Anna Lomeli MD [facility], [address], [place] [facility]

## (undated) NOTE — IP AVS SNAPSHOT
[address]  [address] [facility] [phone]                Discharge Summary   4/7/2017    Marisela Velasquez           Admission Information        Provider Department    4/7/2017 Asya Conti MD [facility] Mat [address] TRIM [address] TRANS SG GES(CPT=[id number])  4/7/2017 (Approximate) 4/7/2018    Scheduling Instructions:     To schedule a test at any [facility], [employer] Scheduling at [phone], Monday through Friday between 7:30 Abs Final Neut Abs Lymphocyte Abso Monocyte Absolu Eosinophil Abso Basophil Absolu    (03/01/17)  66 (03/01/17)  26 (03/01/17)  4 (03/01/17)  3 (03/01/17)  0 -- (03/01/17)  6.5 (03/01/17)  2.6 (03/01/17)  0.4 (03/01/17)  0.3 (03/01/17)  0.0      Radiology

## (undated) NOTE — LETTER
AUTHORIZATION FOR SURGICAL OPERATION OR OTHER PROCEDURE    1.  I hereby authorize Dr. Viri Chahal, and [facility] [facility] staff assigned to my case to perform the following operation and/or procedure at the [facility]:    ___________________________ Patient signature:  ___________________________________________________             Relationship to Patient:             Parent    Responsible person                            Spouse  In case of minor or                     Other  _____________   Incompet

## (undated) NOTE — MR AVS SNAPSHOT
[facility] SPECIALTY [facility] - [place] [address] [place]  [phone]               Thank you for choosing us for your health care visit with Carmen Mosley MD.  We are glad to serve you and happy to provide you with this summary of y